# Patient Record
Sex: MALE | Race: WHITE | NOT HISPANIC OR LATINO | Employment: STUDENT | URBAN - METROPOLITAN AREA
[De-identification: names, ages, dates, MRNs, and addresses within clinical notes are randomized per-mention and may not be internally consistent; named-entity substitution may affect disease eponyms.]

---
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
## 2019-07-25 ENCOUNTER — OFFICE VISIT (OUTPATIENT)
Dept: PEDIATRICS CLINIC | Facility: MEDICAL CENTER | Age: 19
End: 2019-07-25
Payer: COMMERCIAL

## 2019-07-25 VITALS
SYSTOLIC BLOOD PRESSURE: 108 MMHG | BODY MASS INDEX: 21.12 KG/M2 | DIASTOLIC BLOOD PRESSURE: 78 MMHG | WEIGHT: 142.6 LBS | HEART RATE: 80 BPM | TEMPERATURE: 98.4 F | HEIGHT: 69 IN | RESPIRATION RATE: 18 BRPM

## 2019-07-25 DIAGNOSIS — Z00.129 ENCOUNTER FOR ROUTINE CHILD HEALTH EXAMINATION WITHOUT ABNORMAL FINDINGS: Primary | ICD-10-CM

## 2019-07-25 DIAGNOSIS — Z23 NEED FOR VACCINATION: ICD-10-CM

## 2019-07-25 DIAGNOSIS — Z13.31 SCREENING FOR DEPRESSION: ICD-10-CM

## 2019-07-25 DIAGNOSIS — Z71.82 EXERCISE COUNSELING: ICD-10-CM

## 2019-07-25 DIAGNOSIS — Z01.10 ENCOUNTER FOR HEARING EXAMINATION, UNSPECIFIED WHETHER ABNORMAL FINDINGS: ICD-10-CM

## 2019-07-25 DIAGNOSIS — Z01.00 ENCOUNTER FOR VISION SCREENING: ICD-10-CM

## 2019-07-25 DIAGNOSIS — F41.9 MILD ANXIETY: ICD-10-CM

## 2019-07-25 DIAGNOSIS — D22.71 MELANOCYTIC NEVUS OF RIGHT LOWER EXTREMITY: ICD-10-CM

## 2019-07-25 DIAGNOSIS — Z71.3 NUTRITIONAL COUNSELING: ICD-10-CM

## 2019-07-25 DIAGNOSIS — R59.9 REACTIVE LYMPHADENOPATHY: ICD-10-CM

## 2019-07-25 PROCEDURE — 99173 VISUAL ACUITY SCREEN: CPT | Performed by: PEDIATRICS

## 2019-07-25 PROCEDURE — 99395 PREV VISIT EST AGE 18-39: CPT | Performed by: PEDIATRICS

## 2019-07-25 PROCEDURE — 3725F SCREEN DEPRESSION PERFORMED: CPT | Performed by: PEDIATRICS

## 2019-07-25 PROCEDURE — 96127 BRIEF EMOTIONAL/BEHAV ASSMT: CPT | Performed by: PEDIATRICS

## 2019-07-25 PROCEDURE — 3008F BODY MASS INDEX DOCD: CPT | Performed by: PEDIATRICS

## 2019-07-25 PROCEDURE — 92552 PURE TONE AUDIOMETRY AIR: CPT | Performed by: PEDIATRICS

## 2019-07-25 NOTE — PROGRESS NOTES
Assessment/Plan:  Lucio Pina is a 70-year-old college student who presented for his yearly well visit today  His physical exam went well with no unusual problems noted  He does have some mild facial acne  He also pointed out to me a small reactive lymph node in the left posterior cervical region which has been present for years  He has no supraclavicular or submandibular nodes  There are no palpable anterior cervical lymph nodes today  The thyroid was not enlarged  He has no bruits over the neck  Cardiac exam revealed a normal sinus rhythm with no murmurs and he has no irregularities to his rate and rhythm  All of his pulses are easily palpable  Examination of the musculoskeletal system revealed no evidence of any abnormalities on joint exam     Lucio Pina has a small left paraspinal muscle prominence on back examination he has no significant scoliosis and no evidence of scapular asymmetry, shoulder tilt or leg length discrepancy  The remainder of his physical exam was negative today  I reviewed Wells Abt height weight and BMI today  Nutrition and Exercise Counseling: The patient's Body mass index is 20 94 kg/m²  This is 23 %ile (Z= -0 73) based on CDC (Boys, 2-20 Years) BMI-for-age based on BMI available as of 7/25/2019  Nutrition counseling provided:  Anticipatory guidance for nutrition given and counseled on healthy eating habits, 5 servings of fruits/vegetables and Avoid juice/sugary drinks    Exercise counseling provided:  Anticipatory guidance and counseling on exercise and physical activity given, Reduce screen time to less than 2 hours per day, 1 hour of aerobic exercise daily and Take stairs whenever possible     I ENCOURAGED ADONIS TO FIND FORMS OF EXERCISE THAT WOULD HELP HIM ESPECIALLY WITH SOME OF HIS RECENT ANXIETY  I RECOMMEND THAT HE EXERCISE AT LEAST 60 MINUTES DAILY AEROBICALLY    I also encouraged Lucio Pina to continue a well-balanced diet and to continue to make healthy choices including choosing from a variety of fresh fruits, vegetables, whole grains and lean proteins  I mentioned that he should try to eliminate simple sugars from his diet as much as possible  I reviewed his PHQ 9 questionnaire and has a borderline assessment with a score of 9  At the present time Shanique Gallardo is experiencing some anxiety regarding the relationship with his girlfriend  He states that this has him anxious and not acting himself  He has been feeling down about the change in relationship with his girlfriend  I recommend that he keeps in touch with me regarding his feelings anxiety and if things get worse I would recommend referral to a mental health specialist     Depression screen performed:  Patient screened- Negative     I recommended to the patient that he continues to have regular dental visits at least twice yearly  I encouraged him to use a soft toothbrush and a pea-sized amount of fluoride toothpaste to brush his teeth at least twice daily  Impression:  1  Healthy 22year-old college student  2   Recent onset of anxiety and feeling down due to change or break up in relationship with his girlfriend  3   Reactive lymph node left posterior cervical region with no other adenopathy noted  4   Mild adolescent acne  5   Left paraspinal muscle prominence in the lumbar region with no evidence of significant scoliosis  Plan:  1  I recommended that Shanique Gallardo received the meningeal coccal B vaccine and I provided him with literature as well as his mother today in order for them to review the recommendation regarding the vaccine  2   I also recommended that Shanique Gallardo and his mother consider the HPV vaccine series  3   I instructed Shanique Gallardo to keep in touch with me if he continues to feel down in order to have a follow-up visit in the office and in order to make a referral to a mental health specialist if needed    4   I schedule an appointment for Shanique Gallardo to return in 1 year for his next wellness assessment and physical exam         No problem-specific Assessment & Plan notes found for this encounter  Diagnoses and all orders for this visit:    Encounter for routine child health examination without abnormal findings    Screening for depression    Need for vaccination  -     MENINGOCOCCAL B RECOMBINANT    Encounter for hearing examination, unspecified whether abnormal findings    Encounter for vision screening    Body mass index, pediatric, 5th percentile to less than 85th percentile for age    Exercise counseling    Nutritional counseling    Reactive lymphadenopathy    Mild anxiety          Subjective:      Patient ID: Fortunato Dalal is a 22 y o  male  Maty Chen is a 50-year-old college student who presents for his yearly well visit today  He was accompanied to the visit by his younger brother and his mother although [place] exam was done in a separate room and privately with no one else present  He will be entering his 2nd year at [employer] late summer 2019  Maty Chen lives on campus  He is currently working on a summer research project which is in neuro sinuses and psychology area  He is evaluating the individuals perception of visual art in terms of interpretation of the art and the projects also involves performing retinal scans  He is not on any daily medicines and he has no known medication allergies  He did receive HPV vaccine and he will be due for meningeal coccal B vaccine which I will strongly recommend today  Maty Chen is currently having some anxiety regarding relationship be has with his girlfriend  He has not been himself according to the history  His appetite is good and he is having no sleep disruptive problems  His past medical history is unremarkable for any serious illness requiring hospitalization  The following portions of the patient's history were reviewed and updated as appropriate:   He  has a past medical history of Pneumonia    He There are no active problems to display for this patient  He  has a past surgical history that includes Circumcision  His family history includes Asthma in his family and father; Eczema in his family and father; No Known Problems in his mother  He  reports that he has never smoked  He has never used smokeless tobacco  He reports that he does not drink alcohol or use drugs  No current outpatient medications on file  No current facility-administered medications for this visit  No current outpatient medications on file prior to visit  No current facility-administered medications on file prior to visit  He has No Known Allergies       Review of Systems   Constitutional: Negative  HENT: Negative  Eyes: Positive for visual disturbance  Negative for photophobia, pain, discharge, redness and itching  Yasmine Piles wears corrective lenses for nearsightedness  Respiratory: Negative for cough, chest tightness, shortness of breath, wheezing and stridor  Cardiovascular: Negative for chest pain and palpitations  Gastrointestinal: Negative  Endocrine: Negative  Genitourinary: Negative  Musculoskeletal: Negative for back pain, gait problem, joint swelling, neck pain and neck stiffness  Skin: Negative  Allergic/Immunologic: Negative  Neurological: Negative for dizziness, tremors, seizures, syncope, weakness, light-headedness and headaches  Hematological: Negative  Negative for adenopathy  Does not bruise/bleed easily  Psychiatric/Behavioral: Negative for sleep disturbance and suicidal ideas  The patient is nervous/anxious  The patient states that he has been more anxious lately and nervous because of problems in the relationship with his girlfriend  Objective:      /78   Pulse 80   Temp 98 4 °F (36 9 °C) (Tympanic)   Resp 18   Ht 5' 9 2" (1 758 m)   Wt 64 7 kg (142 lb 9 6 oz)   BMI 20 94 kg/m²          Physical Exam   Constitutional: He is oriented to person, place, and time   He appears well-developed and well-nourished  No distress  HENT:   Head: Normocephalic  Right Ear: External ear normal    Left Ear: External ear normal    Nose: Nose normal    Mouth/Throat: Oropharynx is clear and moist  No oropharyngeal exudate  Both tympanic membranes are normal today  Eyes: Pupils are equal, round, and reactive to light  Conjunctivae and EOM are normal  Right eye exhibits no discharge  Left eye exhibits no discharge  No scleral icterus  Pippa Pace wears corrective lenses for nearsightedness  Neck: Normal range of motion  Neck supple  No thyromegaly present  The patient has a small freely movable lump in the left posterior cervical region which has been present for several years according to his history  It appears to be either a epidermoid cysts or a reactive lymph node  He has no other supraclavicular or submandibular or anterior cervical lymph nodes noted today  He has no bruits over the neck  Cardiovascular: Normal rate, regular rhythm, normal heart sounds and intact distal pulses  No murmur heard  Pulmonary/Chest: Effort normal and breath sounds normal    Abdominal: Soft  Bowel sounds are normal  He exhibits no distension and no mass  There is no tenderness  No hernia  Genitourinary: Rectum normal and penis normal    Genitourinary Comments: The patient is a Nadir stage 5  His testes are descended bilaterally  He has no evidence of a hernia today  Musculoskeletal: Normal range of motion  The musculoskeletal and joint exam was negative today  Inspection of the back and spine revealed no evidence of significant scoliosis and no scapular asymmetry  He does not have a significant shoulder tilt  He does have a small left lower lumbar paraspinal muscle prominence  He has no evidence of leg length discrepancy today  Lymphadenopathy:     He has cervical adenopathy  Neurological: He is alert and oriented to person, place, and time  He displays normal reflexes   No cranial nerve deficit  He exhibits normal muscle tone  Coordination normal    Skin: Skin is warm and dry  Capillary refill takes less than 2 seconds  No erythema  No pallor  Patient has some mild adolescent acne over the face with no significant scarring or comedones  Psychiatric: He has a normal mood and affect   His behavior is normal  Thought content normal

## 2019-07-26 NOTE — PATIENT INSTRUCTIONS
Autumn Tomas is a 65-year-old young man who presented for his well visit today  He is currently answering his 2nd year at [employer] and he does live on [facility]  Autumn Tomas is currently involved in a summer research project at school  He is not on any daily medicines and he has no known medication allergies  Javid's immunizations are up-to-date except for his HPV vaccine and the meningeal coccal B vaccine  I provided Autumn Tomas with information regarding the meningeal coccal vaccine which I highly recommend  I also recommend that he considers the HPV vaccine in the near future  Javid's physical exam was quite good with no unusual problems noted today  He does have a small left lumbar paraspinal muscle prominence in the lower back which is postural in nature  He also has a dark pigmented mole or nevus in the right upper leg or lateral thigh which has been present for many years  I recommend that he continues to use a sunscreen whenever he is outside on a cheryl day particularly if he is outside at the peak sun times between 10:00 a m  and 4:00 p m       I discussed some recent anxiety and "feeling down" symptoms that Autumn Tomas has experienced  I asked him to keep in touch with me if he continues to have feelings of anxiety and feeling down so that I can discuss these symptoms with him and possibly make a referral to a mental health specialist if necessary  His physical exam today revealed a mild left paraspinal muscle prominence but no evidence of scoliosis  Autumn Tomas has had a small freely movable reactive lymph node in the left posterior cervical region which appears to be a reactive lymph node and he has no other lymph adenopathy in the supraclavicular, submandibular or anterior cervical regions  I reviewed his length and weight and body mass index today  Nutrition and Exercise Counseling: The patient's Body mass index is 20 94 kg/m²   This is 23 %ile (Z= -0 73) based on CDC (Boys, 2-20 Years) BMI-for-age based on BMI available as of 7/25/2019  Nutrition counseling provided:  Anticipatory guidance for nutrition given and counseled on healthy eating habits, 5 servings of fruits/vegetables and Avoid juice/sugary drinks    Exercise counseling provided:  Anticipatory guidance and counseling on exercise and physical activity given, Reduce screen time to less than 2 hours per day, 1 hour of aerobic exercise daily and Take stairs whenever possible     Mukesh Decker would like to exercise more and I encouraged him to find something like a fun exercise program or riding a stationary bike, swimming, walking or participating in a fun sport at school in the intramural program   His BMI is quite good and he should continue to make healthy food choices by choosing from a variety of fresh fruits, vegetables, whole grains and lean proteins  Mukesh Decker does not have to drink whole milk but can drink 1 percent or low-fat milk  Milk is basically a source of vitamin-D at the present time  He should also try to eliminate simple sugars from his diet as much as possible  Mukesh Decker should continue to have regular dental visits and continue to use a soft toothbrush and a pea-sized amount of fluoride toothpaste to brush his teeth at least twice daily or after meals  The plan is for Mukesh Decker to keep in touch if he is having any increased anxiety or feeling down symptoms so that I can reassess him in the office  I will schedule an appointment for Mukesh Decker to return in 1 year for a yearly wellness assessment and physical exam unless he prefer to go to a family doctor

## 2019-08-21 ENCOUNTER — HOSPITAL ENCOUNTER (EMERGENCY)
Facility: HOSPITAL | Age: 19
Discharge: PSYCHIATRIC OR SUBSTANCE USE FACILITY | End: 2019-08-22
Attending: EMERGENCY MEDICINE
Payer: COMMERCIAL

## 2019-08-21 ENCOUNTER — TELEPHONE (OUTPATIENT)
Dept: OTHER | Facility: OTHER | Age: 19
End: 2019-08-21

## 2019-08-21 DIAGNOSIS — F33.2 SEVERE RECURRENT MAJOR DEPRESSION WITHOUT PSYCHOTIC FEATURES (HCC): Primary | ICD-10-CM

## 2019-08-21 DIAGNOSIS — R45.851 SUICIDAL IDEATION: ICD-10-CM

## 2019-08-21 DIAGNOSIS — F32.A DEPRESSION: ICD-10-CM

## 2019-08-21 PROCEDURE — 99285 EMERGENCY DEPT VISIT HI MDM: CPT

## 2019-08-21 PROCEDURE — 99285 EMERGENCY DEPT VISIT HI MDM: CPT | Performed by: EMERGENCY MEDICINE

## 2019-08-21 PROCEDURE — 80307 DRUG TEST PRSMV CHEM ANLYZR: CPT | Performed by: EMERGENCY MEDICINE

## 2019-08-22 ENCOUNTER — HOSPITAL ENCOUNTER (INPATIENT)
Facility: HOSPITAL | Age: 19
LOS: 5 days | Discharge: HOME | DRG: 751 | End: 2019-08-27
Attending: PSYCHIATRY & NEUROLOGY | Admitting: STUDENT IN AN ORGANIZED HEALTH CARE EDUCATION/TRAINING PROGRAM
Payer: COMMERCIAL

## 2019-08-22 VITALS
BODY MASS INDEX: 20.47 KG/M2 | OXYGEN SATURATION: 98 % | DIASTOLIC BLOOD PRESSURE: 34 MMHG | RESPIRATION RATE: 18 BRPM | HEART RATE: 73 BPM | SYSTOLIC BLOOD PRESSURE: 129 MMHG | TEMPERATURE: 98.4 F | WEIGHT: 143 LBS | HEIGHT: 70 IN

## 2019-08-22 DIAGNOSIS — F33.2 SEVERE RECURRENT MAJOR DEPRESSION WITHOUT PSYCHOTIC FEATURES (HCC): ICD-10-CM

## 2019-08-22 DIAGNOSIS — F32.2 MAJOR DEPRESSIVE DISORDER, SINGLE EPISODE, SEVERE WITHOUT PSYCHOTIC FEATURES (HCC): Primary | Chronic | ICD-10-CM

## 2019-08-22 LAB
AMPHETAMINES SERPL QL SCN: NEGATIVE
BARBITURATES UR QL: NEGATIVE
BENZODIAZ UR QL: NEGATIVE
COCAINE UR QL: NEGATIVE
METHADONE UR QL: NEGATIVE
OPIATES UR QL SCN: NEGATIVE
PCP UR QL: NEGATIVE
THC UR QL: NEGATIVE

## 2019-08-22 PROCEDURE — 93005 ELECTROCARDIOGRAM TRACING: CPT

## 2019-08-22 PROCEDURE — 99253 IP/OBS CNSLTJ NEW/EST LOW 45: CPT | Performed by: INTERNAL MEDICINE

## 2019-08-22 RX ORDER — HALOPERIDOL 5 MG/ML
5 INJECTION INTRAMUSCULAR EVERY 8 HOURS PRN
Status: DISCONTINUED | OUTPATIENT
Start: 2019-08-22 | End: 2019-08-23

## 2019-08-22 RX ORDER — HALOPERIDOL 5 MG
5 TABLET ORAL EVERY 8 HOURS PRN
Status: DISCONTINUED | OUTPATIENT
Start: 2019-08-22 | End: 2019-08-27 | Stop reason: HOSPADM

## 2019-08-22 RX ORDER — HALOPERIDOL 5 MG/ML
5 INJECTION INTRAMUSCULAR EVERY 8 HOURS PRN
Status: CANCELLED | OUTPATIENT
Start: 2019-08-22

## 2019-08-22 RX ORDER — ACETAMINOPHEN 325 MG/1
975 TABLET ORAL EVERY 6 HOURS PRN
Status: DISCONTINUED | OUTPATIENT
Start: 2019-08-22 | End: 2019-08-27 | Stop reason: HOSPADM

## 2019-08-22 RX ORDER — BENZTROPINE MESYLATE 1 MG/ML
1 INJECTION INTRAMUSCULAR; INTRAVENOUS EVERY 6 HOURS PRN
Status: DISCONTINUED | OUTPATIENT
Start: 2019-08-22 | End: 2019-08-23

## 2019-08-22 RX ORDER — HALOPERIDOL 5 MG
5 TABLET ORAL EVERY 8 HOURS PRN
Status: CANCELLED | OUTPATIENT
Start: 2019-08-22

## 2019-08-22 RX ORDER — ACETAMINOPHEN 325 MG/1
975 TABLET ORAL EVERY 6 HOURS PRN
Status: CANCELLED | OUTPATIENT
Start: 2019-08-22

## 2019-08-22 RX ORDER — ACETAMINOPHEN 325 MG/1
650 TABLET ORAL EVERY 4 HOURS PRN
Status: CANCELLED | OUTPATIENT
Start: 2019-08-22

## 2019-08-22 RX ORDER — MAGNESIUM HYDROXIDE/ALUMINUM HYDROXICE/SIMETHICONE 120; 1200; 1200 MG/30ML; MG/30ML; MG/30ML
30 SUSPENSION ORAL EVERY 4 HOURS PRN
Status: CANCELLED | OUTPATIENT
Start: 2019-08-22

## 2019-08-22 RX ORDER — MAGNESIUM HYDROXIDE/ALUMINUM HYDROXICE/SIMETHICONE 120; 1200; 1200 MG/30ML; MG/30ML; MG/30ML
30 SUSPENSION ORAL EVERY 4 HOURS PRN
Status: DISCONTINUED | OUTPATIENT
Start: 2019-08-22 | End: 2019-08-27 | Stop reason: HOSPADM

## 2019-08-22 RX ORDER — BENZTROPINE MESYLATE 1 MG/1
1 TABLET ORAL EVERY 6 HOURS PRN
Status: CANCELLED | OUTPATIENT
Start: 2019-08-22

## 2019-08-22 RX ORDER — TRAZODONE HYDROCHLORIDE 50 MG/1
50 TABLET ORAL
Status: CANCELLED | OUTPATIENT
Start: 2019-08-22

## 2019-08-22 RX ORDER — HYDROXYZINE HYDROCHLORIDE 25 MG/1
25 TABLET, FILM COATED ORAL EVERY 6 HOURS PRN
Status: CANCELLED | OUTPATIENT
Start: 2019-08-22

## 2019-08-22 RX ORDER — LORAZEPAM 0.5 MG/1
1 TABLET ORAL EVERY 8 HOURS PRN
Status: CANCELLED | OUTPATIENT
Start: 2019-08-22

## 2019-08-22 RX ORDER — BENZTROPINE MESYLATE 1 MG/ML
1 INJECTION INTRAMUSCULAR; INTRAVENOUS EVERY 6 HOURS PRN
Status: CANCELLED | OUTPATIENT
Start: 2019-08-22

## 2019-08-22 RX ORDER — LORAZEPAM 2 MG/ML
1 INJECTION INTRAMUSCULAR EVERY 8 HOURS PRN
Status: DISCONTINUED | OUTPATIENT
Start: 2019-08-22 | End: 2019-08-27 | Stop reason: HOSPADM

## 2019-08-22 RX ORDER — ACETAMINOPHEN 325 MG/1
650 TABLET ORAL EVERY 6 HOURS PRN
Status: DISCONTINUED | OUTPATIENT
Start: 2019-08-22 | End: 2019-08-27 | Stop reason: HOSPADM

## 2019-08-22 RX ORDER — LORAZEPAM 1 MG/1
1 TABLET ORAL EVERY 8 HOURS PRN
Status: DISCONTINUED | OUTPATIENT
Start: 2019-08-22 | End: 2019-08-27 | Stop reason: HOSPADM

## 2019-08-22 RX ORDER — RISPERIDONE 1 MG/1
1 TABLET, ORALLY DISINTEGRATING ORAL EVERY 8 HOURS PRN
Status: DISCONTINUED | OUTPATIENT
Start: 2019-08-22 | End: 2019-08-27 | Stop reason: HOSPADM

## 2019-08-22 RX ORDER — BENZTROPINE MESYLATE 1 MG/1
1 TABLET ORAL EVERY 6 HOURS PRN
Status: DISCONTINUED | OUTPATIENT
Start: 2019-08-22 | End: 2019-08-23

## 2019-08-22 RX ORDER — ACETAMINOPHEN 325 MG/1
650 TABLET ORAL EVERY 6 HOURS PRN
Status: CANCELLED | OUTPATIENT
Start: 2019-08-22

## 2019-08-22 RX ORDER — RISPERIDONE 1 MG/1
1 TABLET, ORALLY DISINTEGRATING ORAL EVERY 8 HOURS PRN
Status: CANCELLED | OUTPATIENT
Start: 2019-08-22

## 2019-08-22 RX ORDER — HYDROXYZINE HYDROCHLORIDE 25 MG/1
25 TABLET, FILM COATED ORAL EVERY 6 HOURS PRN
Status: DISCONTINUED | OUTPATIENT
Start: 2019-08-22 | End: 2019-08-27 | Stop reason: HOSPADM

## 2019-08-22 RX ORDER — TRAZODONE HYDROCHLORIDE 50 MG/1
50 TABLET ORAL
Status: DISCONTINUED | OUTPATIENT
Start: 2019-08-22 | End: 2019-08-27 | Stop reason: HOSPADM

## 2019-08-22 RX ORDER — ACETAMINOPHEN 325 MG/1
650 TABLET ORAL EVERY 4 HOURS PRN
Status: DISCONTINUED | OUTPATIENT
Start: 2019-08-22 | End: 2019-08-27 | Stop reason: HOSPADM

## 2019-08-22 RX ORDER — LORAZEPAM 2 MG/ML
1 INJECTION INTRAMUSCULAR EVERY 8 HOURS PRN
Status: CANCELLED | OUTPATIENT
Start: 2019-08-22

## 2019-08-22 NOTE — ED NOTES
Patient is accepted at [facility]  Patient is accepted by Dr Leena Gillespie per [address] is arranged with [employer] is scheduled for [place]  80      Nurse report is to be called to [phone] prior to patient transfer

## 2019-08-22 NOTE — PLAN OF CARE
Problem: Risk for Self Injury/Neglect  Goal: Verbalize thoughts and feelings  Description  Interventions:  - Assess and re-assess patient's lethality and potential for self-injury  - Engage patient in 1:1 interactions, daily, for a minimum of 15 minutes  - Encourage patient to express feelings, fears, frustrations, hopes  - Establish rapport/trust with patient   Outcome: Not Progressing  Goal: Refrain from harming self  Description  Interventions:  - Monitor patient closely, per order  - Develop a trusting relationship  - Supervise medication ingestion, monitor effects and side effects   Outcome: Not Progressing     Problem: Depression  Goal: Refrain from isolation  Description  Interventions:  - Develop a trusting relationship   - Encourage socialization   Outcome: Not Progressing  Goal: Attend and participate in unit activities, including therapeutic, recreational, and educational groups  Description  Interventions:  - Provide therapeutic and educational activities daily, encourage attendance and participation, and document same in the medical record   Outcome: Not Progressing     Problem: Risk for Violence/Aggression Toward Others  Goal: Treatment Goal: Refrain from acts of violence/aggression during length of stay, and demonstrate improved impulse control at the time of discharge  Outcome: Not Progressing  Goal: Refrain from harming others  Outcome: Not Progressing

## 2019-08-22 NOTE — TELEPHONE ENCOUNTER
[facility] 2000  CONFIDENTIALTY NOTICE: This fax transmission is intended only for the addressee  It contains information that is legally privileged,  confidential or otherwise protected from use or disclosure  If you are not the intended recipient, you are strictly prohibited from reviewing,  disclosing, copying using or disseminating any of this information or taking any action in reliance on or regarding this information  If you have  received this fax in error, please notify us immediately by telephone so that we can arrange for its return to us  Page: 1  2  Call Id: [id number]  Health Call  Standard Call Report  Health Call  [facility]  Gender[facility]  : 2000  Age: 22 Y 8 M 12 D  Return Phone  Number: [phone] (Home)  Address: [place] [address]  City/[facility]/Zip: [address] [place] [address]  Practice Name: [address] [place] [address] Charged:  Physician:  Greg Rdz Name: Munira [place]  Relationship To  Patient: Mother  Return Phone Number: [phone] (Home)  Presenting Problem: "My son is having a hard time  emotionally and I would like to receive  care advice "  Service Type: Messages  Charged Service 1: Messages  Pharmacy Name and  Number:  Nurse Assessment  Nurse: Niraj Pike Date/Time: 2019 9:28:18 PM  Type of assessment required:  Patient's mother refused of triage  ---General (Adult or Child)  Protocols  Protocol Title Nurse Date/Time  Disp  Time Disposition Final User  2019 9:43:55 PM Send to Follow Up [facility]  2019 10:46:12 PM Close Niraj Pike  Comments  User: Gonzalez Sánchez Date/Time: 2019 9:43:24 PM  Mother called, stated that her son might be having episode of depression for few days  Refused to go over triage questions  Mother requested to speak to Dr Harini Live right now  Mother was explained that there is Dr Emely Sin on call yuridia  Mother  inseasted to speak to Dr Harini Live  Dr Sean gordon was tiger paged  Awaiting for Dr Lisa Power to call back  User: Douglas Hanley Date/Time: 8/21/2019 10:45:34 PM  Called Dr Kourtney Carlton on call  Dr Lisa Power was made aware of patient's mother requested to speak to Dr Hiwot Ibarra  Per Dr Kourtney Carlton, patient to be taken to ER if he is a danger to himself  Called mother  instructed to take patient to ED if he is in  danger or to call office in a morning if patient is stable to wait till morning per Dr Sean Gordon's instructions  Mother was  upset, stated that she was willing to talk to doctor on call about the situation  She stated that she would take her son to ED now  Sally Lynch 2000  CONFIDENTIALTY NOTICE: This fax transmission is intended only for the addressee  It contains information that is legally privileged,  confidential or otherwise protected from use or disclosure  If you are not the intended recipient, you are strictly prohibited from reviewing,  disclosing, copying using or disseminating any of this information or taking any action in reliance on or regarding this information  If you have  received this fax in error, please notify us immediately by telephone so that we can arrange for its return to us    Page: 2 of 2  Call Id: [id number]  Comments

## 2019-08-22 NOTE — ED NOTES
Pt was brought to the ED tonight by his mother because of her increased concern for his safety  Pt told the ED doctor that he will take walks in the middle of the night to no where and has thoughts of harming himself  Pt states he feels "stuck" and isn't "going anywhere " Pt states he has times where he feels 'okay' but then goes right back to feeling "really bad"  He reports a recent break up with his girlfriend about a month ago when this episode started  Pt states he has not been sleeping well or eating for the past few weeks due to his increased anxiety and depression  Pt is insightful at times but feels that the coping skills he has been using like drawing are only a distraction  Pt feels "lonely" even though he knows he has a support system  Pt reports increased irritability at strangers for "stupid things" and has thoughts of fighting others but knows that "wouldn't be good"  Pt reports low self-worth and feels like he is not able to function on a day to day basis  He reports multiple assignment due for his summer schooling and work documents that need completed but states he feels "no drive to do them"  Pt willing to sign 201  Pt and his family requesting he stay within the area so they can visit him  They prefer [address] [facility] [facility] but pt is willing to go to [facility] as well   Pt 201 faxed to Intake

## 2019-08-22 NOTE — ED NOTES
Patient given beverage and snack   At this time patient upset and crying     Corky Morrow  08/21/19 0105

## 2019-08-22 NOTE — ED NOTES
Patient clinically stable, making no threats of self harm, harm to others  Not a flight risk  No psychosis  At this time, he does not require continual observation in the emergency department  León Holloway MD  Emergency Medicine       Cherylelodia Vallejo MD  08/22/19 4585

## 2019-08-22 NOTE — ED ATTENDING ATTESTATION
Jaylen Be MD, saw and evaluated the patient  I have discussed the patient with the resident/non-physician practitioner and agree with the resident's/non-physician practitioner's findings, Plan of Care, and MDM as documented in the resident's/non-physician practitioner's note, except where noted  All available labs and Radiology studies were reviewed  I was present for key portions of any procedure(s) performed by the resident/non-physician practitioner and I was immediately available to provide assistance  At this point I agree with the current assessment done in the Emergency Department  I have conducted an independent evaluation of this patient a history and physical is as follows: This is a 22 y o  old male who presents to the ED for evaluation of depression  Hx of same, episodes x1y  Severe the last few days  Is in college ,mom concerned if he goes back and he may not go back  -drugs, alcohol, vague SI, no plan, no HI, no psychosis or manic episodes  VS and nursing notes reviewed  General: Appears in NAD, awake, alert, speaking normally in full sentences  Well-nourished, well-developed  Appears stated age  Head: Normocephalic, atraumatic  Eyes: EOMI  Vision grossly normal  No subconjunctival hemorrhages or occular discharge noted  Symmetrical lids  ENT: Atraumatic external nose and ears  No stridor  Normal phonation  No drooling  Normal swallowing  Neck: No JVD  FROM  No goiter  CV: No pallor  Normal rate  Lungs: No tachypnea  No respiratory distress  MSK: Moving all extremities equally, no peripheral edema  Skin: Dry, intact  No cyanosis  Neuro: Awake, alert, GCS15  CN II-XII grossly intact  Grossly normal gait  Psychiatric/Behavioral: Appropriate mood and affect  A/P: This is a 22 y o  male who presents to the ED for evaluation of depression  We will check breath alcohol and UDS   Crisis referral     Critical Care Time  Procedures

## 2019-08-22 NOTE — ED PROVIDER NOTES
History  Chief Complaint   Patient presents with    Psychiatric Evaluation     Pt states he is depressed and having suicidal thoughts  Pt not specific about thoughts but also having HI and does not want to give details on who he is having those thoughts towards  Pt denies hallucinations and states there are no triggers for this episode  Patient presents at mother's wishes for severe depression 1 mo duration  Has suicidal thoughts    No prior attempt but does have prior thoughts, no treatment to date, no medical problems no medicines at baseline  Silvsetre is previously well functioning college student  Extra Depressed over then last month, might have started with depressive episodes over a year or so  Thought about killing self, jumping off stuff  Said he wanted to "go off, walk, go somewhere, hurting self or someone else"  "Don't have a plan"     Difficult taking care of self, getting work done, getting enough sleep, etc    Mother worried and wanted him to come in today, worried that he won't attend class or function in society in this state  Frustrating to him how things people do really bothers me for example people cat calling women, objectifying other people,  Frustrated at personal incompetence    Possible episodes of fouzia, hard to sort out from history  No drugs  Medium alcohol use  2 nights ago  No cigarette use          None       Past Medical History:   Diagnosis Date    Pneumonia     Sleep difficulties        Past Surgical History:   Procedure Laterality Date    CIRCUMCISION         Family History   Problem Relation Age of Onset    No Known Problems Mother     Asthma Father     Eczema Father     Asthma Family     Eczema Family      I have reviewed and agree with the history as documented  Social History     Tobacco Use    Smoking status: Never Smoker    Smokeless tobacco: Never Used    Tobacco comment: no tobacco/smoke exposure   Substance Use Topics    Alcohol use:  Yes Frequency: Monthly or less     Drinks per session: 1 or 2     Binge frequency: Never    Drug use: No        Review of Systems   Constitutional: Negative for activity change, chills, diaphoresis, fatigue and fever  HENT: Negative for congestion, postnasal drip, rhinorrhea, sneezing, sore throat and trouble swallowing  Eyes: Negative for visual disturbance  Respiratory: Negative for cough, chest tightness, shortness of breath and wheezing  Cardiovascular: Negative for chest pain and leg swelling  Gastrointestinal: Negative for abdominal distention, abdominal pain, blood in stool, constipation, diarrhea, nausea and vomiting  Genitourinary: Negative for decreased urine volume, dysuria, flank pain, frequency, hematuria and urgency  Skin: Negative for color change and rash  Neurological: Negative for syncope, weakness, light-headedness and headaches  Psychiatric/Behavioral: Positive for decreased concentration, dysphoric mood, sleep disturbance and suicidal ideas  Negative for confusion  All other systems reviewed and are negative  Physical Exam  ED Triage Vitals [08/21/19 2301]   Temperature Pulse Respirations Blood Pressure SpO2   98 4 °F (36 9 °C) 81 18 140/81 100 %      Temp Source Heart Rate Source Patient Position - Orthostatic VS BP Location FiO2 (%)   Oral Monitor Lying Right arm --      Pain Score       No Pain             Orthostatic Vital Signs  Vitals:    08/21/19 2301 08/22/19 0430 08/22/19 0849 08/22/19 1311   BP: 140/81 102/64 118/62 (!) 129/34   Pulse: 81 77 88 73   Patient Position - Orthostatic VS: Lying Lying Sitting Sitting       Physical Exam   Constitutional: He is oriented to person, place, and time  He appears well-developed and well-nourished  No distress  HENT:   Head: Normocephalic and atraumatic  Nose: Nose normal    Eyes: Pupils are equal, round, and reactive to light  Conjunctivae and EOM are normal  No scleral icterus  Neck: Normal range of motion   Neck supple  No tracheal deviation present  Cardiovascular: Normal rate, regular rhythm, normal heart sounds and intact distal pulses  No murmur heard  Pulmonary/Chest: Effort normal and breath sounds normal  No stridor  No respiratory distress  He has no wheezes  Abdominal: Soft  Bowel sounds are normal  He exhibits no distension  There is no tenderness  There is no guarding  Musculoskeletal: Normal range of motion  He exhibits no edema, tenderness or deformity  Neurological: He is alert and oriented to person, place, and time  No cranial nerve deficit or sensory deficit  He exhibits normal muscle tone  Skin: Skin is warm and dry  Capillary refill takes less than 2 seconds  He is not diaphoretic  Psychiatric:   tearful   Nursing note and vitals reviewed  ED Medications  Medications - No data to display    Diagnostic Studies  Results Reviewed     Procedure Component Value Units Date/Time    Rapid drug screen, urine [[id number]]  (Normal) Collected:  08/21/19 2346    Lab Status:  Final result Specimen:  Urine, Clean Catch Updated:  08/22/19 0033     Amph/Meth UR Negative     Barbiturate Ur Negative     Benzodiazepine Urine Negative     Cocaine Urine Negative     Methadone Urine Negative     Opiate Urine Negative     PCP Ur Negative     THC Urine Negative    Narrative:       FOR MEDICAL PURPOSES ONLY  IF CONFIRMATION NEEDED PLEASE CONTACT THE LAB WITHIN 5 DAYS      Drug Screen Cutoff Levels:  AMPHETAMINE/METHAMPHETAMINES  1000 ng/mL  BARBITURATES     200 ng/mL  BENZODIAZEPINES     200 ng/mL  COCAINE      300 ng/mL  METHADONE      300 ng/mL  OPIATES      300 ng/mL  PHENCYCLIDINE     25 ng/mL  THC       50 ng/mL                   No orders to display         Procedures  Procedures        ED Course                               MDM  Number of Diagnoses or Management Options  Depression:   Suicidal ideation:   Diagnosis management comments: Crisis consulted    [address] heart      Disposition  Final diagnoses:   Depression   Suicidal ideation     Time reflects when diagnosis was documented in both MDM as applicable and the Disposition within this note     Time User Action Codes Description Comment    8/22/2019 11:49 AM Ansley Wu Add [F33 2] Severe recurrent major depression without psychotic features ([facility] [address] )     8/22/2019  2:28 PM Shayy  Add [F32 9] Depression     8/22/2019  2:28 PM [place]  Add [R45 851] Suicidal ideation       ED Disposition     ED Disposition Condition Date/Time Comment    Transfer to [facility] Aug 22, 2019  1:14 PM Emily Howard should be transferred out to [place] and has been medically cleared  MD Documentation      Most Recent Value   Accepting Physician  Fan Mckeon Name, Yuri Patton   Sending MD  [facility] Name, Yuri Patton      Follow-up Information    None         There are no discharge medications for this patient  No discharge procedures on file  ED Provider  Attending physically available and evaluated Emily Howard  I managed the patient along with the ED Attending      Electronically Signed by         Jeanine Mckeon MD  08/23/19 0571

## 2019-08-22 NOTE — EMTALA/ACUTE CARE TRANSFER
[facility] EMERGENCY DEPARTMENT  [address] [place] [address]  Dept: [phone]      JVPRAO TRANSFER CONSENT    NAME Jeremias Meade                                         2000                              MRN [id number]    I have been informed of my rights regarding examination, treatment, and transfer   by Dr Milan Mcdermott MD    Benefits:      Risks:        Transfer Request   I acknowledge that my medical condition has been evaluated and explained to me by the emergency department physician or other qualified medical person and/or my attending physician who has recommended and offered to me further medical examination and treatment  I understand the Hospital's obligation with respect to the treatment and stabilization of my emergency medical condition  I nevertheless request to be transferred  I release the Hospital, the doctor, and any other persons caring for me from all responsibility or liability for any injury or ill effects that may result from my transfer and agree to accept all responsibility for the consequences of my choice to transfer, rather than receive stabilizing treatment at the Hospital  I understand that because the transfer is my request, my insurance may not provide reimbursement for the services  The Hospital will assist and direct me and my family in how to make arrangements for transfer, but the hospital is not liable for any fees charged by the transport service  In spite of this understanding, I refuse to consent to further medical examination and treatment which has been offered to me, and request transfer to  [place] Name, [address] : [facility]  I authorize the performance of emergency medical procedures and treatments upon me in both transit and upon arrival at the receiving facility  Additionally, I authorize the release of any and all medical records to the receiving facility and request they be transported with me, if possible      I authorize the performance of emergency medical procedures and treatments upon me in both transit and upon arrival at the receiving facility  Additionally, I authorize the release of any and all medical records to the receiving facility and request they be transported with me, if possible  I understand that the safest mode of transportation during a medical emergency is an ambulance and that the Hospital advocates the use of this mode of transport  Risks of traveling to the receiving facility by car, including absence of medical control, life sustaining equipment, such as oxygen, and medical personnel has been explained to me and I fully understand them  (LAMAR CORRECT BOX BELOW)  [  ]  I consent to the stated transfer and to be transported by ambulance/helicopter  [  ]  I consent to the stated transfer, but refuse transportation by ambulance and accept full responsibility for my transportation by car  I understand the risks of non-ambulance transfers and I exonerate the Hospital and its staff from any deterioration in my condition that results from this refusal     X___________________________________________    DATE  19  TIME________  Signature of patient or legally responsible individual signing on patient behalf           RELATIONSHIP TO PATIENT_________________________          Provider Certification    NAME James Apodaca                                        [facility] 2000                              MRN [id number]    A medical screening exam was performed on the above named patient  Based on the examination:    Condition Necessitating Transfer The encounter diagnosis was Severe recurrent major depression without psychotic features ([facility] [address] )      Patient Condition:      Reason for Transfer:      Transfer Requirements: [address]   · Space available and qualified personnel available for treatment as acknowledged by    · Agreed to accept transfer and to provide appropriate medical treatment as acknowledged by       [facility]  · Appropriate medical records of the examination and treatment of the patient are provided at the time of transfer   [facility], [address] _______  · Transfer will be performed by qualified personnel from    and appropriate transfer equipment as required, including the use of necessary and appropriate life support measures  Provider Certification: I have examined the patient and explained the following risks and benefits of being transferred/refusing transfer to the patient/family:         Based on these reasonable risks and benefits to the patient and/or the unborn child(reinaldo), and based upon the information available at the time of the patients examination, I certify that the medical benefits reasonably to be expected from the provision of appropriate medical treatments at another medical facility outweigh the increasing risks, if any, to the individuals medical condition, and in the case of labor to the unborn child, from effecting the transfer      X____________________________________________ DATE 08/22/19        TIME_______      ORIGINAL - SEND TO MEDICAL RECORDS   COPY - SEND WITH PATIENT DURING TRANSFER

## 2019-08-22 NOTE — ED NOTES
Pt states he feels safe to himself while in the ED and does not need a 1:1 at this time  He is calm and cooperative

## 2019-08-22 NOTE — ED NOTES
Pt resting comfortably at this time        Andre Pino  08/22/19 1511       Michelle Caban  08/22/19 1519

## 2019-08-22 NOTE — ED NOTES
Insurance Authorization:   Phone call placed to [employer] number: [phone]  Spoke to [employer]  4 days approved  Level of care: IPU  Review on pending placement   Authorization # call upon arrival         EVS (Eligibility Verification System) called - [phone]    Automated system indicates: managed care with [facility]

## 2019-08-23 PROBLEM — F33.2 MAJOR DEPRESSIVE DISORDER, RECURRENT, SEVERE WITHOUT PSYCHOTIC FEATURES (HCC): Chronic | Status: ACTIVE | Noted: 2019-08-23

## 2019-08-23 PROBLEM — F32.2 MAJOR DEPRESSIVE DISORDER, SINGLE EPISODE, SEVERE WITHOUT PSYCHOTIC FEATURES (HCC): Chronic | Status: ACTIVE | Noted: 2019-08-23

## 2019-08-23 LAB
ALBUMIN SERPL BCP-MCNC: 4.5 G/DL (ref 3–5.2)
ALP SERPL-CCNC: 75 U/L (ref 43–122)
ALT SERPL W P-5'-P-CCNC: 15 U/L (ref 9–52)
ANION GAP SERPL CALCULATED.3IONS-SCNC: 9 MMOL/L (ref 5–14)
AST SERPL W P-5'-P-CCNC: 33 U/L (ref 17–59)
ATRIAL RATE: 62 BPM
ATRIAL RATE: 62 BPM
BASOPHILS # BLD AUTO: 0 THOUSANDS/ΜL (ref 0–0.1)
BASOPHILS NFR BLD AUTO: 1 % (ref 0–1)
BILIRUB SERPL-MCNC: 0.7 MG/DL
BUN SERPL-MCNC: 14 MG/DL (ref 5–25)
CALCIUM SERPL-MCNC: 9.9 MG/DL (ref 8.9–10.7)
CHLORIDE SERPL-SCNC: 99 MMOL/L (ref 97–108)
CHOLEST SERPL-MCNC: 163 MG/DL
CO2 SERPL-SCNC: 31 MMOL/L (ref 22–30)
CREAT SERPL-MCNC: 1.24 MG/DL (ref 0.7–1.5)
EOSINOPHIL # BLD AUTO: 0.2 THOUSAND/ΜL (ref 0–0.4)
EOSINOPHIL NFR BLD AUTO: 3 % (ref 0–6)
ERYTHROCYTE [DISTWIDTH] IN BLOOD BY AUTOMATED COUNT: 13 %
EST. AVERAGE GLUCOSE BLD GHB EST-MCNC: 97 MG/DL
GFR SERPL CREATININE-BSD FRML MDRD: 84 ML/MIN/1.73SQ M
GLUCOSE P FAST SERPL-MCNC: 87 MG/DL (ref 70–99)
GLUCOSE SERPL-MCNC: 87 MG/DL (ref 70–99)
HBA1C MFR BLD: 5 % (ref 4.2–6.3)
HCT VFR BLD AUTO: 44.6 % (ref 41–53)
HDLC SERPL-MCNC: 41 MG/DL (ref 40–59)
HGB BLD-MCNC: 15.1 G/DL (ref 13.5–17.5)
LDLC SERPL CALC-MCNC: 101 MG/DL
LYMPHOCYTES # BLD AUTO: 2.3 THOUSANDS/ΜL (ref 0.5–4)
LYMPHOCYTES NFR BLD AUTO: 38 % (ref 25–45)
MCH RBC QN AUTO: 29.5 PG (ref 26–34)
MCHC RBC AUTO-ENTMCNC: 33.9 G/DL (ref 31–36)
MCV RBC AUTO: 87 FL (ref 80–100)
MONOCYTES # BLD AUTO: 0.5 THOUSAND/ΜL (ref 0.2–0.9)
MONOCYTES NFR BLD AUTO: 8 % (ref 1–10)
NEUTROPHILS # BLD AUTO: 3.1 THOUSANDS/ΜL (ref 1.8–7.8)
NEUTS SEG NFR BLD AUTO: 52 % (ref 45–65)
NONHDLC SERPL-MCNC: 122 MG/DL
P AXIS: 71 DEGREES
P AXIS: 71 DEGREES
PLATELET # BLD AUTO: 206 THOUSANDS/UL (ref 150–450)
PMV BLD AUTO: 8.7 FL (ref 8.9–12.7)
POTASSIUM SERPL-SCNC: 4.4 MMOL/L (ref 3.6–5)
PR INTERVAL: 148 MS
PR INTERVAL: 148 MS
PROT SERPL-MCNC: 7.1 G/DL (ref 5.9–8.4)
QRS AXIS: 101 DEGREES
QRS AXIS: 101 DEGREES
QRSD INTERVAL: 86 MS
QRSD INTERVAL: 86 MS
QT INTERVAL: 394 MS
QT INTERVAL: 394 MS
QTC INTERVAL: 399 MS
QTC INTERVAL: 399 MS
RBC # BLD AUTO: 5.14 MILLION/UL (ref 4.5–5.9)
SODIUM SERPL-SCNC: 139 MMOL/L (ref 137–147)
T WAVE AXIS: 51 DEGREES
T WAVE AXIS: 51 DEGREES
TRIGL SERPL-MCNC: 105 MG/DL
TSH SERPL DL<=0.05 MIU/L-ACNC: 1.14 UIU/ML (ref 0.47–4.68)
VENTRICULAR RATE: 62 BPM
VENTRICULAR RATE: 62 BPM
WBC # BLD AUTO: 6 THOUSAND/UL (ref 4.5–11)

## 2019-08-23 PROCEDURE — 84443 ASSAY THYROID STIM HORMONE: CPT | Performed by: STUDENT IN AN ORGANIZED HEALTH CARE EDUCATION/TRAINING PROGRAM

## 2019-08-23 PROCEDURE — 83036 HEMOGLOBIN GLYCOSYLATED A1C: CPT | Performed by: PSYCHIATRY & NEUROLOGY

## 2019-08-23 PROCEDURE — 99222 1ST HOSP IP/OBS MODERATE 55: CPT | Performed by: PSYCHIATRY & NEUROLOGY

## 2019-08-23 PROCEDURE — 80053 COMPREHEN METABOLIC PANEL: CPT | Performed by: STUDENT IN AN ORGANIZED HEALTH CARE EDUCATION/TRAINING PROGRAM

## 2019-08-23 PROCEDURE — 80061 LIPID PANEL: CPT | Performed by: STUDENT IN AN ORGANIZED HEALTH CARE EDUCATION/TRAINING PROGRAM

## 2019-08-23 PROCEDURE — 86592 SYPHILIS TEST NON-TREP QUAL: CPT | Performed by: STUDENT IN AN ORGANIZED HEALTH CARE EDUCATION/TRAINING PROGRAM

## 2019-08-23 PROCEDURE — 93010 ELECTROCARDIOGRAM REPORT: CPT | Performed by: INTERNAL MEDICINE

## 2019-08-23 PROCEDURE — 85025 COMPLETE CBC W/AUTO DIFF WBC: CPT | Performed by: STUDENT IN AN ORGANIZED HEALTH CARE EDUCATION/TRAINING PROGRAM

## 2019-08-23 RX ORDER — HALOPERIDOL 5 MG/ML
5 INJECTION INTRAMUSCULAR EVERY 6 HOURS PRN
Status: DISCONTINUED | OUTPATIENT
Start: 2019-08-23 | End: 2019-08-27 | Stop reason: HOSPADM

## 2019-08-23 RX ORDER — OLANZAPINE 10 MG/1
10 TABLET, ORALLY DISINTEGRATING ORAL
Status: DISCONTINUED | OUTPATIENT
Start: 2019-08-23 | End: 2019-08-27 | Stop reason: HOSPADM

## 2019-08-23 RX ORDER — OLANZAPINE 10 MG/1
10 INJECTION, POWDER, LYOPHILIZED, FOR SOLUTION INTRAMUSCULAR
Status: DISCONTINUED | OUTPATIENT
Start: 2019-08-23 | End: 2019-08-27 | Stop reason: HOSPADM

## 2019-08-23 RX ORDER — BENZTROPINE MESYLATE 1 MG/ML
1 INJECTION INTRAMUSCULAR; INTRAVENOUS EVERY 6 HOURS PRN
Status: DISCONTINUED | OUTPATIENT
Start: 2019-08-23 | End: 2019-08-27 | Stop reason: HOSPADM

## 2019-08-23 RX ORDER — BENZTROPINE MESYLATE 1 MG/1
1 TABLET ORAL EVERY 6 HOURS PRN
Status: DISCONTINUED | OUTPATIENT
Start: 2019-08-23 | End: 2019-08-27 | Stop reason: HOSPADM

## 2019-08-23 RX ORDER — SERTRALINE HYDROCHLORIDE 25 MG/1
25 TABLET, FILM COATED ORAL ONCE
Status: COMPLETED | OUTPATIENT
Start: 2019-08-23 | End: 2019-08-23

## 2019-08-23 RX ADMIN — SERTRALINE HYDROCHLORIDE 25 MG: 25 TABLET ORAL at 13:20

## 2019-08-23 NOTE — TREATMENT PLAN
TREATMENT PLAN REVIEW - [address] 22 y o  2000 male MRN: [id number]    [address] [address] Room / Bed: [facility]- Encounter: [id number]        Admit Date/Time:  8/22/2019  6:28 PM    Treatment Team: Attending Provider: Frieda Odom MD; Patient Care Technician: Austin Bravo; Registered Nurse: Elease Bamberger, RN; Patient Care Technician: Monda Cowden    Diagnosis: Principal Problem:    Major depressive disorder, single episode, severe without psychotic features [facility])    Patient Strengths/Assets: general fund of knowledge, motivated, patient is on a voluntary commitment, supportive mother      Patient Barriers/Limitations: break up with girlfriend, low self esteem    Short Term Goals: decrease in depressive symptoms, decrease in anxiety symptoms, decrease in suicidal thoughts, decrease in homicidal thoughts, tolerate medications    Long Term Goals: improvement in anxiety, resolution of depressive symptoms, free of suicidal thoughts, free of homicidal thoughts, adequate sleep    Progress Towards Goals: starting psychiatric medications as prescribed    Recommended Treatment: medication management, patient medication education, group therapy, milieu therapy, continued Behavioral Health psychiatric evaluation/assessment process     Treatment Frequency: daily medication monitoring, group and milieu therapy daily, monitoring through interdisciplinary rounds, monitoring through weekly patient care conferences    Expected Discharge Date: 4 days - 8/27/2019    Discharge Plan: referral for outpatient medication management with a psychiatrist, referral for outpatient psychotherapy, return to previous living arrangement    Treatment Plan Created/Updated By: Saskia Gar MD

## 2019-08-23 NOTE — PLAN OF CARE
Problem: SLEEP DISTURBANCE  Goal: Will exhibit normal sleeping pattern  Description  Interventions:  -  Assess the patients sleep pattern, noting recent changes  - Administer medication as ordered  - Decrease environmental stimuli, including noise, as appropriate during the night  - Encourage the patient to actively participate in unit groups and or exercise during the day to enhance ability to achieve adequate sleep at night  - Assess the patient, in the morning, encouraging a description of sleep experience  Outcome: Not Progressing     Problem: SELF HARM/SUICIDALITY  Goal: Will have no self-injury during hospital stay  Description  INTERVENTIONS:  - Q 15 MINUTES: Routine safety checks  - Q WAKING SHIFT & PRN: Assess risk to determine if routine checks are adequate to maintain patient safety  - Encourage patient to participate actively in care by formulating a plan to combat response to suicidal ideation, identify supports and resources  Outcome: Not Progressing

## 2019-08-23 NOTE — PROGRESS NOTES
08/23/19 0838   Team Meeting   Meeting Type Tx Team Meeting   Initial Conference Date 08/23/19   Team Members Present   Team Members Present Physician;Nurse;;; Other (Discipline and Name)   Physician Team Member Dr Vannessa Stevens, [facility]   Social Work Team Member Jaydon   Other (Discipline and Name) Med student   Patient/Family Present   Patient Present No   Patient's Family Present No   Medication management

## 2019-08-23 NOTE — CASE MANAGEMENT
Pt presented to [facility] as a [facility] accompanied by his parents due to increased symptom severity of depression, anxiety, inner agitation, loneliness and homicidal ideation towards people in general; pt had SI with plans to jump off a bridge or strangle himself  Pt reports no history of prior psychiatric treatment and denies substance abuse      Symptoms prior to admission included worsening depression, suicidal ideation, hopelessness, helplessness, poor concentration, difficulty sleeping, increased irritability and anxiety symptoms  Onset of symptoms was gradual starting 1 year ago with progressively worsening course over the last month  Stressors preceding admission included family issues and break up with girlfriend  Arsh Anderson was brought in to ED by his mother due to worsening depression and thoughts of self harm  He reported poor motivation, feeling angry at strangers and thoughts of fighting others  His mother was concerned about his safety and brought him in to ED     On initial evaluation after admission to the inpatient psychiatric unit Arsh Anderson was still feeling depressed and still had suicidal thoughts, but felt safe on the inpatient unit He also seemed very anxious and preoccupied with his school work  He reluctantly agreed to try an antidepressant to help with his symptoms  Pt also appears paranoid and stares at times in a bizarre and preoccupied fashion  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 [employer] and his classes will start next week, he is very anxious that he will not be there to move in this weekend  Pt states he has no mental health providers  Pt signed Ray Rick for his mother [phone] or [phone]     Spoke with pt's mother, she states the pt did not have a childhood mental health history but that he looked very stressed and pressured most of last year and thinks his anxiety and depression levels were building since that time and when his girlfriend (first serious relationship) broke up with him, that was the last straw  Pt's mom states that depression and psychosis run in both sides of the family, her mother had psychosis issues   Pt's mother is supportive of pt and states he should remain in hospital until psychiatrist is comfortable with discharging him; she will help him regarding college and what they need to do regarding contacting the school

## 2019-08-23 NOTE — PROGRESS NOTES
Pt is admission from [facility] ED as 201  Has prolonged history of depression and "can't remember within the last few months when he felt happy once during an entire day " States last night he had some active thoughts to kill himself but lacked the means of doing so  Suggested he considered strangulation of falling from high location  Preceding events triggering decline in mood are recent breakup with girlfriend of a year and a half  Pt became nearly tearful when describing situation  Also cited some anger towards others, which is ironic considering he "actually likes and gets along with most people " Particular behaviors such as "those that objectify women" cause him anger  Report states pt has considered thoughts of "getting in fight" with someone to deal with anger; but does not currently hold these feelings  Suggests there's reason to consider underlying depression prior to breakup  States "I often feel lonely in general, and don't have anyone to share my emotions with and they tend to get bottled up " Also states while his parents love him he does "not always believe they care about him and it's usually about their best interests "    Denies substance abuse and alcohol misuse  Currently has no thoughts to harm self and contracts for safety of self and others  I polite and cooperative, offering good insight into emotions and areas needing progress

## 2019-08-23 NOTE — PROGRESS NOTES
[facility] NOTE     08/23/19 1000   Activity/Group Checklist   Group Admission/Discharge  (Completed Adm Self Assess, DERs)   Attendance Attended   Attendance Duration (min) 16-30   Interactions Interacted appropriately   Affect/Mood Appropriate   Goals Achieved Identified feelings; Able to self-disclose

## 2019-08-23 NOTE — PROGRESS NOTES
Patient tearful talking about former GF  Patient blames himself and and states "If I had been a better person and more considerate "  Patient feels he is "obsessing" over the GF and feels he "can't move forward and I dont know if I want to "  Patient denies SI/HI and denies A/V hallucinations   Patient has fair eye contact, good insight, and is medication compliant

## 2019-08-23 NOTE — PLAN OF CARE
Problem: Nutrition/Hydration-ADULT  Goal: Nutrient/Hydration intake appropriate for improving, restoring or maintaining nutritional needs  Description  Monitor and assess patient's nutrition/hydration status for malnutrition  Collaborate with interdisciplinary team and initiate plan and interventions as ordered  Monitor patient's weight and dietary intake as ordered or per policy  Utilize nutrition screening tool and intervene as necessary  Determine patient's food preferences and provide high-protein, high-caloric foods as appropriate       INTERVENTIONS:  - Monitor oral intake, urinary output, labs, and treatment plans  - Assess nutrition and hydration status and recommend course of action  - Evaluate amount of meals eaten  - Assist patient with eating if necessary   - Allow adequate time for meals  - Recommend/ encourage appropriate diets, oral nutritional supplements, and vitamin/mineral supplements  - Order, calculate, and assess calorie counts as needed  - Recommend, monitor, and adjust tube feedings and TPN/PPN based on assessed needs  - Assess need for intravenous fluids  - Provide specific nutrition/hydration education as appropriate  - Include patient/family/caregiver in decisions related to nutrition  Outcome: Not Progressing

## 2019-08-23 NOTE — PLAN OF CARE
Problem: Risk for Self Injury/Neglect  Goal: Verbalize thoughts and feelings  Description  Interventions:  - Assess and re-assess patient's lethality and potential for self-injury  - Engage patient in 1:1 interactions, daily, for a minimum of 15 minutes  - Encourage patient to express feelings, fears, frustrations, hopes  - Establish rapport/trust with patient   Outcome: Progressing  Goal: Refrain from harming self  Description  Interventions:  - Monitor patient closely, per order  - Develop a trusting relationship  - Supervise medication ingestion, monitor effects and side effects   Outcome: Progressing     Problem: Depression  Goal: Refrain from isolation  Description  Interventions:  - Develop a trusting relationship   - Encourage socialization   Outcome: Progressing  Goal: Attend and participate in unit activities, including therapeutic, recreational, and educational groups  Description  Interventions:  - Provide therapeutic and educational activities daily, encourage attendance and participation, and document same in the medical record   Outcome: Progressing     Problem: Risk for Violence/Aggression Toward Others  Goal: Treatment Goal: Refrain from acts of violence/aggression during length of stay, and demonstrate improved impulse control at the time of discharge  Outcome: Progressing  Goal: Refrain from harming others  Outcome: Progressing     Problem: Nutrition/Hydration-ADULT  Goal: Nutrient/Hydration intake appropriate for improving, restoring or maintaining nutritional needs  Description  Monitor and assess patient's nutrition/hydration status for malnutrition  Collaborate with interdisciplinary team and initiate plan and interventions as ordered  Monitor patient's weight and dietary intake as ordered or per policy  Utilize nutrition screening tool and intervene as necessary  Determine patient's food preferences and provide high-protein, high-caloric foods as appropriate       INTERVENTIONS:  - Monitor oral intake, urinary output, labs, and treatment plans  - Assess nutrition and hydration status and recommend course of action  - Evaluate amount of meals eaten  - Assist patient with eating if necessary   - Allow adequate time for meals  - Recommend/ encourage appropriate diets, oral nutritional supplements, and vitamin/mineral supplements  - Order, calculate, and assess calorie counts as needed  - Recommend, monitor, and adjust tube feedings and TPN/PPN based on assessed needs  - Assess need for intravenous fluids  - Provide specific nutrition/hydration education as appropriate  - Include patient/family/caregiver in decisions related to nutrition  Outcome: Progressing     Problem: SELF HARM/SUICIDALITY  Goal: Will have no self-injury during hospital stay  Description  INTERVENTIONS:  - Q 15 MINUTES: Routine safety checks  - Q WAKING SHIFT & PRN: Assess risk to determine if routine checks are adequate to maintain patient safety  - Encourage patient to participate actively in care by formulating a plan to combat response to suicidal ideation, identify supports and resources  Outcome: Progressing     Problem: SLEEP DISTURBANCE  Goal: Will exhibit normal sleeping pattern  Description  Interventions:  -  Assess the patients sleep pattern, noting recent changes  - Administer medication as ordered  - Decrease environmental stimuli, including noise, as appropriate during the night  - Encourage the patient to actively participate in unit groups and or exercise during the day to enhance ability to achieve adequate sleep at night  - Assess the patient, in the morning, encouraging a description of sleep experience  Outcome: Progressing

## 2019-08-23 NOTE — PROGRESS NOTES
[facility] NOTE     08/23/19 1115   Activity/Group Checklist   Group Personal control  (Jalen Chi)   Attendance Attended   Attendance Duration (min) 0-15   Interactions Did not interact   Affect/Mood Calm   Learning Objectives  Mind/Body Connection  Relaxation as part of daily Routine  Relaxation as coping strategy for emotional distress  Benefits of Proactive Relaxation  Modified Jalen Chi Technique (movement and breath)

## 2019-08-23 NOTE — CONSULTS
Consulted for Medical Management by:  [facility]    History of Present Illness     HPI:  Miguel Ángel Ricketts is a 22 y o  male who presents with pmhx of anxietythat was brought in by family for depression and suicidal ideation  Pt was said to have a recent relationship breakup with his girlfriend  Denies any use of illicit drugs or etoh abuse  Pt had thought of strangulation or jumping off from a high location  He also had some homicidal ideation  Currently states he is feeling better  No auditory or visual hallucinations  No headache, blurry vision  No cp, sob, no abdominal pain, n/v/d  No syncope or seizures  Historical Information   Past Medical History:   Diagnosis Date    Pneumonia     Sleep difficulties      There is no problem list on file for this patient      Past Surgical History:   Procedure Laterality Date    CIRCUMCISION         Social History   Social History     Substance and Sexual Activity   Alcohol Use Yes    Frequency: Monthly or less    Drinks per session: 1 or 2    Binge frequency: Never     Social History     Substance and Sexual Activity   Drug Use No     Social History     Tobacco Use   Smoking Status Never Smoker   Smokeless Tobacco Never Used   Tobacco Comment    no tobacco/smoke exposure       Family History:   Family History   Problem Relation Age of Onset    No Known Problems Mother     Asthma Father     Eczema Father     Asthma Family     Eczema Family        Meds/Allergies       Current Facility-Administered Medications:     acetaminophen (TYLENOL) tablet 650 mg, 650 mg, Oral, Q6H PRN, Jayde Basurto MD    acetaminophen (TYLENOL) tablet 650 mg, 650 mg, Oral, Q4H PRN, Jayde Basurto MD    acetaminophen (TYLENOL) tablet 975 mg, 975 mg, Oral, Q6H PRN, Jayde Basurto MD    aluminum-magnesium hydroxide-simethicone (MYLANTA) 200-200-20 mg/5 mL oral suspension 30 mL, 30 mL, Oral, Q4H PRN, Jayde Basurto MD    benztropine (COGENTIN) injection 1 mg, 1 mg, Intramuscular, Q6H PRN, Nicholas Dubon MD    benztropine (COGENTIN) tablet 1 mg, 1 mg, Oral, Q6H PRN, Nicholas Dubon MD    haloperidol (HALDOL) tablet 5 mg, 5 mg, Oral, Q8H PRN, Nicholas Dubon MD    haloperidol lactate (HALDOL) injection 5 mg, 5 mg, Intramuscular, Q8H PRN, Nicholas Dubon MD    hydrOXYzine HCL (ATARAX) tablet 25 mg, 25 mg, Oral, Q6H PRN, Nicholas Dubon MD    LORazepam (ATIVAN) 2 mg/mL injection 1 mg, 1 mg, Intramuscular, Q8H PRN, Nicholas Dubon MD    LORazepam (ATIVAN) tablet 1 mg, 1 mg, Oral, Q8H PRN, Nicholas Dubon MD    magnesium hydroxide (MILK OF MAGNESIA) 400 mg/5 mL oral suspension 30 mL, 30 mL, Oral, Daily PRN, Nicholas Dubon MD    risperiDONE (RisperDAL M-TABS) dispersible tablet 1 mg, 1 mg, Oral, Q8H PRN, Nicholas Dubon MD    traZODone (DESYREL) tablet 50 mg, 50 mg, Oral, HS PRN, Nicholas Dubon MD    No Known Allergies    Review of Systems   Constitutional: Negative for activity change, appetite change, chills, fatigue and fever  HENT: Negative for congestion, ear discharge, hearing loss, postnasal drip, rhinorrhea, sore throat and trouble swallowing  Eyes: Negative for photophobia, pain and discharge  Respiratory: Negative for cough, chest tightness, shortness of breath and wheezing  Cardiovascular: Negative for chest pain, palpitations and leg swelling  Gastrointestinal: Negative for abdominal distention, blood in stool, constipation, diarrhea and vomiting  Endocrine: Negative for cold intolerance, polydipsia and polyphagia  Genitourinary: Negative for difficulty urinating, flank pain and urgency  Musculoskeletal: Negative for arthralgias, gait problem and neck pain  Skin: Negative for color change and rash  Allergic/Immunologic: Negative for immunocompromised state  Neurological: Negative for dizziness, tremors, seizures, facial asymmetry, speech difficulty, weakness and light-headedness     Psychiatric/Behavioral: Positive for behavioral problems  The patient is nervous/anxious  Objective   Vitals: Blood pressure 124/80, pulse 76, temperature 98 5 °F (36 9 °C), temperature source Temporal, resp  rate 16, height 5' 10" (1 778 m), weight 62 9 kg (138 lb 9 6 oz), SpO2 97 %  Physical Exam   General- Awake, and alert, NAD  HEENT: PERRLA, EOMI, sclera anicteric, moist mucous membranes, tongue mucosa without lesions  Neck: supple, no JVD, lymphadenopathy, thyromegaly  Heart: Regular rate and rhythm, S1S2 present  No murmur, rub or gallop  Lungs; Clear to auscultation bilaterally  No wheezing, crackles or rhonchi  No accessory muscle use or respiratory distress  Abdomen: soft, non-tender, non-distended, NABS  No guarding or rebound  No peritoneal sound or mass  Extremities: no clubbing, cyanosis, or edema  2+ pedal pulses bilaterally  Full range of motion  Neurologic:  Cranial nerves II-XII intact  Strength and sensation globally intact  Speech fluent and goal directed  Awake, alert and oriented x 3  Skin: warm and dry  No petechiae, purpura or rash  Assessment/Plan     Assessment:  Depression with suicidal ideation  Anxiety    Plan:  Pt in under 201 commitment  Monitor in [facility]  F/U psychiatrist recommendations  Need baseline CBC, BMP, TSH and EKG    Code Status: Level 1 - Full Code      Counseling / Coordination of Care  Total floor / unit time spent today 30 minutes  Greater than 50% of total time was spent with the patient and / or family counseling and / or coordination of care  Portions of the record may have been created with voice recognition software  Occasional wrong word or "sound a like" substitutions may have occurred due to the inherent limitations of voice recognition software  Read the chart carefully and recognize, using context, where substitutions have occurred

## 2019-08-23 NOTE — QUICK NOTE
Psychiatric Evaluation - Behavioral Health   [place] 22 y o  male MRN: [id number]  Unit/Bed#: [facility] 340-01 Encounter: [id number]    Assessment/Plan   Principal Problem:    Major depressive disorder, single episode, severe without psychotic features ([facility] [address] )    Plan:   Zoloft 25mg today with plan of increasing to 50mg tomorrow for mood  Encouraged to attend group sessions and to socialize with others  Chief Complaint: "I was feeling depressed and was having suicidal thoughts"    History of Present Illness     Deena Wray is a 22 y o  male presenting with feelings of depression and anxiety accompanied with suicidal and homicidal thoughts  The patient states that he has been feeling depressed for the last year with a recent worsening over the last month after his girlfriend broke up with him  He states that over the last month he has been depressed and at one point punched the floor multiple times until his knuckles bled  The patient was eventually brought in to the ER on  08/21 by his mother who was concerned for his safety  The patient admits to suicidal ideation with a plan to jump off a bridge or to strangle himself and homicidal ideation with no specific person in mind  Deena Wray has never seen a psychiatrist or therapist and has never tried any psychiatric medications  The patient states that recently his sleep has been poor, he has lost 13 lbs over the last few months despite a good appetite, and that he has had decreased motivation  He expresses interest in drawing and painting which he has utilized as a coping strategy for his feelings  He has had feelings of guilt and lack of self worth, but denies fouzia and hallucinations  Deena Wray has tried marijuana one time about a year ago, but has not smoked it since and has never tried any other recreational drugs  He drinks alcohol socially and has never used tobacco  He drinks one cup of coffee a day   The patient expresses good support from his mother, but a strained relationship with his father who "does not understand what I am going through"  The patient had never been hospitalized for psychiatric illness before  Radha Rivers was admitted on a voluntary 201 basis for depression with suicidal and homicidal ideation  Primary complaints include: anxiety, depression worse, difficulty sleeping, feeling depressed, feeling suicidal and relationship difficulties  Onset of symptoms was gradual starting 1 month ago with gradually worsening course since that time  Psychosocial Stressors: family, occupational, and school        Psychiatric Review Of Systems:  sleep: decreased sleep  appetite changes: no  weight changes: yes, loss of 13 lbs over the last few months despite no change to appetitie  energy/anergy: no  interest/pleasure/anhedonia: no  somatic symptoms: no  anxiety/panic: yes  fouzia: no  guilty/hopeless: yes  self injurious behavior/risky behavior: yes, patient punched floor until knuckles bled over the last month    Historical Information     Past Psychiatric History:   Patient has never seen a psychiatrist or therapist  Past Suicide attempts: none  Past Violent behavior: no  Past Psychiatric medication trial: none    Substance Abuse History:  Social History     Tobacco History     Smoking Status  Never Smoker    Smokeless Tobacco Use  Never Used    Tobacco Comment  no tobacco/smoke exposure          Alcohol History     Alcohol Use Status  Yes, socially          Drug Use     Drug Use Status  No current drug use          Sexual Activity     Sexually Active  Yes with females          Activities of Daily Living    Not Asked               Additional Substance Use Detail     Questions Responses    Substance Use Assessment Substance use within the past 12 months    Alcohol Use Frequency Denies use in past 12 months    Cannabis frequency Once about a year ago    Comment: Never used on 8/22/2019     Heroin Frequency Denies use in past 12 months    Cocaine frequency Never used    Comment: Never used on 8/22/2019     Crack Cocaine Frequency Denies use in past 12 months    Methamphetamine Frequency Denies use in past 12 months    Narcotic Frequency Denies use in past 12 months    Benzodiazepine Frequency Denies use in past 12 months    Amphetamine frequency Denies use in past 12 months    Barbituate Frequency Denies use use in past 12 months    Inhalant frequency Never used    Comment: Never used on 8/22/2019     Hallucinogen frequency Never used    Comment: Never used on 8/22/2019     Ecstasy frequency Never used    Comment: Never used on 8/22/2019     Other drug frequency Never used    Comment: Never used on 8/22/2019     Opiate frequency Denies use in past 12 months    Last reviewed by Sheila Eubanks RN on 8/22/2019        I have assessed this patient for substance use within the past 12 months    Family Psychiatric History:   Psychiatric Illness Paternal grandfather Illness: MDD and Substance Abuse Maternal grandmother Illness: MDD    Social History:  Education: High School Graduate, currently in college at [address] [place] [address] Road: none  Marital history: single  Living arrangement, social support: Good support from mother  Strained relationship with father  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  Other Pertinent History: None      Traumatic History:   Abuse: None  Other Traumatic Events: None    Past Medical History:   Diagnosis Date    Pneumonia     Sleep difficulties        Medical Review Of Systems:  A comprehensive review of systems was negative      Meds/Allergies   current meds:   Current Facility-Administered Medications   Medication Dose Route Frequency    acetaminophen (TYLENOL) tablet 650 mg  650 mg Oral Q6H PRN    acetaminophen (TYLENOL) tablet 650 mg  650 mg Oral Q4H PRN    acetaminophen (TYLENOL) tablet 975 mg  975 mg Oral Q6H PRN    aluminum-magnesium hydroxide-simethicone (MYLANTA) 200-200-20 mg/5 mL oral suspension 30 mL  30 mL Oral Q4H PRN    benztropine (COGENTIN) injection 1 mg  1 mg Intramuscular Q6H PRN    benztropine (COGENTIN) tablet 1 mg  1 mg Oral Q6H PRN    haloperidol (HALDOL) tablet 5 mg  5 mg Oral Q8H PRN    haloperidol lactate (HALDOL) injection 5 mg  5 mg Intramuscular Q6H PRN    hydrOXYzine HCL (ATARAX) tablet 25 mg  25 mg Oral Q6H PRN    LORazepam (ATIVAN) 2 mg/mL injection 1 mg  1 mg Intramuscular Q8H PRN    LORazepam (ATIVAN) tablet 1 mg  1 mg Oral Q8H PRN    magnesium hydroxide (MILK OF MAGNESIA) 400 mg/5 mL oral suspension 30 mL  30 mL Oral Daily PRN    OLANZapine (ZyPREXA ZYDIS) dispersible tablet 10 mg  10 mg Oral Q3H PRN    OLANZapine (ZyPREXA) IM injection 10 mg  10 mg Intramuscular Q3H PRN    risperiDONE (RisperDAL M-TABS) dispersible tablet 1 mg  1 mg Oral Q8H PRN    sertraline (ZOLOFT) tablet 25 mg  25 mg Oral Once    [START ON 8/24/2019] sertraline (ZOLOFT) tablet 50 mg  50 mg Oral Daily    traZODone (DESYREL) tablet 50 mg  50 mg Oral HS PRN     No Known Allergies    Objective   Vital signs in last 24 hours:  Temp:  [97 9 °F (36 6 °C)-98 5 °F (36 9 °C)] 97 9 °F (36 6 °C)  HR:  [67-76] 67  Resp:  [16-18] 16  BP: (124-129)/(34-80) 128/74    No intake or output data in the 24 hours ending 08/23/19 1110    Mental Status Evaluation:  Appearance:  casually dressed and thin & gaunt looking   Behavior:  guarded   Speech:  tangential   Mood:  anxious and depressed   Affect:  blunted   Language: naming objects and repeating phrases   Thought Process:  disorganized and tangential   Thought Content:  normal   Perceptual Disturbances: None   Risk Potential: Suicidal Ideations with plan to jump off bridge and strangle himself, Homicidal Ideations without plan and Potential for Aggression No   Sensorium:  Oriented to person, place and time/date   Cognition:  grossly intact   Consciousness:  alert and awake    Attention: attention span appeared shorter than expected for age Intellect: normal   Fund of Knowledge: awareness of current events     Insight:  limited   Judgment: limited   Muscle Strength and Tone: Normal   Gait/Station: normal gait/station   Motor Activity: no abnormal movements     Lab Results:   CBC:   Lab Results   Component Value Date    WBC 6 00 08/23/2019    HGB 15 1 08/23/2019    HCT 44 6 08/23/2019    MCV 87 08/23/2019     08/23/2019    MCH 29 5 08/23/2019    MCHC 33 9 08/23/2019    RDW 13 0 08/23/2019    MPV 8 7 (L) 08/23/2019   , BMP/CMP:   Lab Results   Component Value Date    K 4 4 08/23/2019    CL 99 08/23/2019    CO2 31 (H) 08/23/2019    BUN 14 08/23/2019    CREATININE 1 24 08/23/2019    CALCIUM 9 9 08/23/2019    AST 33 08/23/2019    ALT 15 08/23/2019    ALKPHOS 75 08/23/2019    EGFR 84 08/23/2019     TSH: 1 14 at 05:58 on 08/23/19    Imaging Studies: None    EKG, Pathology, and Other Studies: None

## 2019-08-23 NOTE — H&P
Psychiatric Evaluation - Behavioral Health     Identification Data:Javid Holt 22 y o  male MRN: [id number]  Unit/Bed#: [facility] 340-01 Encounter: [id number]    Chief Complaint: depression, suicidal ideation and homicidal ideation    History of Present Illness     Lenny Self is a 22 y o  male with no history of psychiatric treatment who was admitted to the inpatient psychiatric unit on a voluntary 201 commitment basis due to depression, suicidal ideation with plan to jump off a bridge or strangle self and homicidal ideation  Symptoms prior to admission included worsening depression, suicidal ideation, hopelessness, helplessness, poor concentration, difficulty sleeping, increased irritability and anxiety symptoms  Onset of symptoms was gradual starting 1 year ago with progressively worsening course over the last month  Stressors preceding admission included family issues and break up with girlfriend  Stacy Reardon was brought in to ED by his mother due to worsening depression and thoughts of self harm  He reported poor motivation, feeling angry at strangers and thoughts of fighting others  His mother was concerned about his safety and brought him in to ED    On initial evaluation after admission to the inpatient psychiatric unit Stacy Reardon was still feeling depressed and still had suicidal thoughts, but felt safe on the inpatient unit He also seemed very anxious and preoccupied with his school work  He reluctantly agreed to try an antidepressant to help with his symptoms      Psychiatric Review Of Systems:    Sleep changes: yes, decreased  Appetite changes: yes, decreased  Weight changes: yes, weight loss 13 lb  Energy/anergy: yes, decreased  Interest/pleasure/anhedonia: yes, decreased  Somatic symptoms: no  Anxiety/panic: worrying  Marina: no  Guilty/hopeless: yes  Self injurious behavior/risky behavior: yes, history of punching floor last month  Suicidal ideation: yes, plan to jump off a bridge or strangle self (no access to guns at home)  Homicidal ideation: yes, towards "people"  Auditory hallucinations: no  Visual hallucinations: no  Other hallucinations: no  Delusional thinking: no  Eating disorder history: no  Obsessive/compulsive symptoms: no    Historical Information     Past Psychiatric History:     Past Inpatient Psychiatric Treatment:   No history of past inpatient psychiatric admissions  Past Outpatient Psychiatric Treatment:    No history of past outpatient psychiatric treatment  Has never seen a psychiatrist prior to admission    Past Suicide Attempts: no history of suicide attempts, but has a history of self abusive behavior by hitting floor  Past Violent Behavior: no  Past Psychiatric Medication Trials: none     Substance Abuse History:    Social History     Tobacco History     Smoking Status  Never Smoker    Smokeless Tobacco Use  Never Used    Tobacco Comment  no tobacco/smoke exposure          Alcohol History     Alcohol Use Status  Yes          Drug Use     Drug Use Status  No          Sexual Activity     Sexually Active  Not Currently          Activities of Daily Living    Not Asked               Additional Substance Use Detail     Questions Responses    Substance Use Assessment Substance use within the past 12 months    Alcohol Use Frequency Denies use in past 12 months    Cannabis frequency Never used    Comment: Never used on 8/22/2019     Heroin Frequency Denies use in past 12 months    Cocaine frequency Never used    Comment: Never used on 8/22/2019     Crack Cocaine Frequency Denies use in past 12 months    Methamphetamine Frequency Denies use in past 12 months    Narcotic Frequency Denies use in past 12 months    Benzodiazepine Frequency Denies use in past 12 months    Amphetamine frequency Denies use in past 12 months    Barbiturate Frequency Denies use in past 12 months    Inhalant frequency Never used    Comment: Never used on 8/22/2019     Hallucinogen frequency Never used    Comment: Never used on 8/22/2019 Ecstasy frequency Never used    Comment: Never used on 8/22/2019     Other drug frequency Never used    Comment: Never used on 8/22/2019     Opiate frequency Denies use in past 12 months    Last reviewed by Husam Morley RN on 8/22/2019        I have assessed this patient for substance use within the past 12 months    Alcohol use: occasional, social use  Recreational drug use:   Cocaine:  denies use  Heroin:  denies use  Marijuana:  denies current use, tried in the past on few occasions  Other drugs: denies use   Longest clean time: several years  History of Inpatient/Outpatient rehabilitation program: no  Smoking history: denies use  Use of caffeine: 1 cup of coffee per day    Family Psychiatric History:     Psychiatric Illness:  Grandmother - depression, Grandfather - depression  Substance Abuse:   Aunt - alcohol abuse  Suicide Attempts:  no family history of suicide attempts    Social History:    Education: high school graduate and student at [employer])  Learning Disabilities: none  Marital History: single  Children: none  Living Arrangement: lives in home with father, mother and 2 brothers  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 [employer]  Functioning Relationships: mother is supportive, poor relationship with father  Legal History: none   History: None    Traumatic History:     Abuse: none  Other Traumatic Events: none     Past Medical History:    History of Seizures: no  History of Head injury with loss of consciousness: no    Past Medical History:   Diagnosis Date    Pneumonia     Sleep difficulties      Past Surgical History:   Procedure Laterality Date    CIRCUMCISION      WISDOM TOOTH EXTRACTION         Medical Review Of Systems:    A comprehensive review of systems was negative except for: Behavioral/Psych: positive for anxiety, depression, homicidal ideation, hopelessness, irritability, sleep disturbance and suicidal ideation    Allergies:    No Known Allergies    Medications: All current active medications have been reviewed  Medications prior to admission:    None       OBJECTIVE:    Vital signs in last 24 hours:    Temp:  [97 9 °F (36 6 °C)-98 5 °F (36 9 °C)] 97 9 °F (36 6 °C)  HR:  [67-76] 67  Resp:  [16-18] 16  BP: (124-129)/(34-80) 128/74    No intake or output data in the 24 hours ending 08/23/19 1043     Mental Status Evaluation:    Appearance:  casually dressed   Behavior:  cooperative, limited eye contact   Speech:  soft, tangential   Mood:  depressed, anxious   Affect:  blunted   Language: naming objects and repeating phrases   Thought Process:  tangential   Associations: tangential associations   Thought Content:  no overt delusions   Perceptual Disturbances: no auditory hallucinations, no visual hallucinations   Risk Potential: Suicidal ideation - Yes, with plan to jump off a bridge or strangle self, contracts for safety on the unit  Homicidal ideation - None  Potential for aggression - No   Sensorium:  oriented to person, place and time/date   Memory:  recent and remote memory grossly intact   Consciousness:  alert and awake   Attention: decreased concentration and decreased attention span   Intellect: average   Fund of Knowledge: awareness of current events: yes  past history: yes  vocabulary: normal   Insight:  impaired   Judgment: impaired   Muscle Strength Muscle Tone: normal  normal   Gait/Station: normal gait/station, normal balance   Motor Activity: no abnormal movements       Laboratory Results: I have personally reviewed all pertinent laboratory/tests results      Admission Labs:   Admission on 08/22/2019   Component Date Value    WBC 08/23/2019 6 00     RBC 08/23/2019 5 14     Hemoglobin 08/23/2019 15 1     Hematocrit 08/23/2019 44 6     MCV 08/23/2019 87     MCH 08/23/2019 29 5     MCHC 08/23/2019 33 9     RDW 08/23/2019 13 0     MPV 08/23/2019 8 7*    Platelets 92/07/6498 206     Neutrophils Relative 08/23/2019 52     Lymphocytes Relative 08/23/2019 38     Monocytes Relative 08/23/2019 8     Eosinophils Relative 08/23/2019 3     Basophils Relative 08/23/2019 1     Neutrophils Absolute 08/23/2019 3 10     Lymphocytes Absolute 08/23/2019 2 30     Monocytes Absolute 08/23/2019 0 50     Eosinophils Absolute 08/23/2019 0 20     Basophils Absolute 08/23/2019 0 00     Sodium 08/23/2019 139     Potassium 08/23/2019 4 4     Chloride 08/23/2019 99     CO2 08/23/2019 31*    ANION GAP 08/23/2019 9     BUN 08/23/2019 14     Creatinine 08/23/2019 1 24     Glucose 08/23/2019 87     Glucose, Fasting 08/23/2019 87     Calcium 08/23/2019 9 9     AST 08/23/2019 33     ALT 08/23/2019 15     Alkaline Phosphatase 08/23/2019 75     Total Protein 08/23/2019 7 1     Albumin 08/23/2019 4 5     Total Bilirubin 08/23/2019 0 70     eGFR 08/23/2019 84     Cholesterol 08/23/2019 163     Triglycerides 08/23/2019 105     HDL, Direct 08/23/2019 41     LDL Calculated 08/23/2019 101     Non-HDL-Chol (CHOL-HDL) 08/23/2019 122     TSH 3RD GENERATON 08/23/2019 1 140     Ventricular Rate 08/22/2019 62     Atrial Rate 08/22/2019 62     FL Interval 08/22/2019 148     QRSD Interval 08/22/2019 86     QT Interval 08/22/2019 394     QTC Interval 08/22/2019 399     P Axis 08/22/2019 71     QRS [place] 08/22/2019 101     T Wave Axis 08/22/2019 51     Ventricular Rate 08/22/2019 62     Atrial Rate 08/22/2019 62     FL Interval 08/22/2019 148     QRSD Interval 08/22/2019 86     QT Interval 08/22/2019 394     QTC Interval 08/22/2019 399     P Axis 08/22/2019 71     QRS [place] 08/22/2019 101     T Wave Axis 08/22/2019 51    Drug Screen:   Lab Results   Component Value Date    AMPMETHUR Negative 08/21/2019    BARBTUR Negative 08/21/2019    BDZUR Negative 08/21/2019    THCUR Negative 08/21/2019    COCAINEUR Negative 08/21/2019    METHADONEUR Negative 08/21/2019    OPIATEUR Negative 08/21/2019 PCPUR Negative 08/21/2019       Imaging Studies: No results found  Code Status: Level 1 - Full Code  Advance Directive and Living Will: <no information>    Assessment/Plan   Principal Problem:    Major depressive disorder, single episode, severe without psychotic features ([facility] [address] )    Patient Strengths: general fund of knowledge, motivated, patient is on a voluntary commitment, supportive mother     Patient Barriers: break up with girlfriend, low self esteem    Treatment Plan:     Planned Treatment and Medication Changes: All current active medications have been reviewed    Encourage group therapy, milieu therapy and occupational therapy  [address] checks every 7 minutes  Start Zoloft 25 mg daily and titrate dose to 50 mg daily to help with depressive symptoms    Current medications:    Current Facility-Administered Medications:  acetaminophen 650 mg Oral Q6H PRN Shellie Castillo, MD   acetaminophen 650 mg Oral Q4H PRN Shellie Castillo, MD   acetaminophen 975 mg Oral Q6H PRN Shellie Castillo MD   aluminum-magnesium hydroxide-simethicone 30 mL Oral Q4H PRN Shellie Castillo MD   benztropine 1 mg Intramuscular Q6H PRN Anushka Schmitz MD   benztropine 1 mg Oral Q6H PRN Anushka Schmitz MD   haloperidol 5 mg Oral Q8H PRN Shellie Castillo MD   haloperidol lactate 5 mg Intramuscular Q6H PRN Anushka Schmitz MD   hydrOXYzine HCL 25 mg Oral Q6H PRN Shellie Castillo MD   LORazepam 1 mg Intramuscular Q8H PRN Shellie Castillo MD   LORazepam 1 mg Oral Q8H PRN Shellie Castillo MD   magnesium hydroxide 30 mL Oral Daily PRN Shellie Castillo MD   OLANZapine 10 mg Oral Q3H PRN Anushka Schmitz MD   OLANZapine 10 mg Intramuscular Q3H PRN Anushka Schmitz MD   risperiDONE 1 mg Oral Q8H PRN Shellie Castillo MD   sertraline 25 mg Oral Once Anushka Schmitz MD   [START ON 8/24/2019] sertraline 50 mg Oral Daily Anushka Schmitz MD   traZODone 50 mg Oral HS PRN Shellie Castillo MD       Risks / Benefits of Treatment:    Risks, benefits, and possible side effects of medications explained to patient including risk of suicidality and serotonin syndrome related to treatment with antidepressants  The patient verbalizes understanding and agreement for treatment  Counseling / Coordination of Care:    Patient's presentation on admission and proposed treatment plan discussed with treatment team   Diagnosis, medication changes and treatment plan reviewed with patient  Stressors preceding admission discussed with patient including family issues and break up with girlfriend  Events leading to admission reviewed with patient  Inpatient Psychiatric Certification:    Estimated length of stay: 4 midnights    Based upon physical, mental and social evaluations, I certify that inpatient psychiatric services are medically necessary for this patient for a duration of 4 midnights for the treatment of Major depressive disorder, single episode, severe without psychotic features ([facility] [address] )    Available alternative community resources do not meet the patient's mental health care needs  I further attest that an established written individualized plan of care has been implemented and is outlined in the patient's medical records    The patient has been released from the Emergency Department and medically cleared as per Emergency Department documentation for psychiatric admission for Major depressive disorder, single episode, severe without psychotic features ([facility] [address] )      Theodore Wilde MD 08/23/19

## 2019-08-23 NOTE — DISCHARGE SUMMARY
Discharge Summary - [address] [facility] [address] 22 y o  male MRN: [id number]  Unit/Bed#: -01 Encounter: [id number]     Admission Date: 8/22/2019         Discharge Date: 8/27/2019    Attending Psychiatrist: Suri Mayo MD    Reason for Admission/HPI:     Marlene Coburn is a 22 y o  male with no history of psychiatric treatment who was admitted to the inpatient psychiatric unit on a voluntary 201 commitment basis due to depression, suicidal ideation with plan to jump off a bridge or strangle self and homicidal ideation      Symptoms prior to admission included worsening depression, suicidal ideation, hopelessness, helplessness, poor concentration, difficulty sleeping, increased irritability and anxiety symptoms  Onset of symptoms was gradual starting 1 year ago with progressively worsening course over the last month  Stressors preceding admission included family issues and break up with girlfriend  Lucio Pina was brought in to ED by his mother due to worsening depression and thoughts of self harm  He reported poor motivation, feeling angry at strangers and thoughts of fighting others  His mother was concerned about his safety and brought him in to ED     On initial evaluation after admission to the inpatient psychiatric unit Lucio Pina was still feeling depressed and still had suicidal thoughts, but felt safe on the inpatient unit He also seemed very anxious and preoccupied with his school work  He reluctantly agreed to try an antidepressant to help with his symptoms  Past Psychiatric History:      Past Inpatient Psychiatric Treatment:   No history of past inpatient psychiatric admissions  Past Outpatient Psychiatric Treatment:    No history of past outpatient psychiatric treatment  Has never seen a psychiatrist prior to admission    Past Suicide Attempts: no history of suicide attempts, but has a history of self abusive behavior by hitting floor  Past Violent Behavior: no  Past Psychiatric Medication Trials: none     Substance Abuse History:    Alcohol use: occasional, social use  Recreational drug use:   Cocaine:        denies use  Heroin:             denies use  Marijuana:        denies current use, tried in the past on few occasions  Other drugs:   denies use   Longest clean time: several years  History of Inpatient/Outpatient rehabilitation program: no  Smoking history: denies use  Use of caffeine: 1 cup of coffee per day     Family Psychiatric History:      Psychiatric Illness:     Grandmother - depression, Grandfather - depression  Substance Abuse: Aunt - alcohol abuse  Suicide Attempts:       no family history of suicide attempts     Social History:     Education: high school graduate and student at [employer]  Learning Disabilities: none  Marital History: single  Children: none  Living Arrangement: lives in home with father, mother and 2 brothers  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 [employer]  Functioning Relationships: mother is supportive, poor relationship with father  Legal History: none   History: None     Traumatic History:      Abuse: none  Other Traumatic Events: none      Past Medical History:     History of Seizures: no  History of Head injury with loss of consciousness: no    Past Medical History:   Diagnosis Date    Pneumonia     Sleep difficulties      Past Surgical History:   Procedure Laterality Date    CIRCUMCISION      WISDOM TOOTH EXTRACTION         Medications: All current active medications have been reviewed      prior to admission:    None     Allergies:     No Known Allergies    Objective     Vital signs in last 24 hours:    Temp:  [98 3 °F (36 8 °C)-98 4 °F (36 9 °C)] 98 4 °F (36 9 °C)  HR:  [] 107  Resp:  [16] 16  BP: (141-155)/(70-72) 155/72    No intake or output data in the 24 hours ending 08/27/19 [address] [place] [address],[address] Course:     Santa Torres was admitted to the inpatient psychiatric unit and started on Behavioral Health checks every 7 minutes  During the hospitalization he was encouraged to attend individual therapy, group therapy, milieu therapy and occupational therapy  Psychiatric medications were initiated during the hospital stay  To address depressive symptoms and anxiety symptoms, Ade Norman was treated with antidepressant Zoloft  Medication doses were gradually titrated during the hospital course  Zoloft was started and titrated to 50 mg daily  Prior to beginning of treatment medications risks and benefits and possible side effects including risk of suicidality and serotonin syndrome related to treatment with antidepressants were reviewed with Ade Norman  He verbalized understanding and agreement for treatment  Upon admission Ade Norman was seen by medical service for medical clearance for inpatient treatment and medical follow up  Javid's symptoms slowly improved over the hospital course  Initially after admission he was still feeling depressed, anxious and overwhelmed  He also still had suicidal thoughts  With adjustment of medications and therapeutic milieu his symptoms gradually resolved  At the end of treatment Ade Norman was doing much better  His mood was significantly improved at the time of discharge  He denied suicidal ideation, intent or plan at the time of discharge and denied homicidal ideation, intent or plan at the time of discharge  There was no overt psychosis at the time of discharge  her was participating appropriately in milieu at the time of discharge  Sleep and appetite were improved  He was tolerating medications and was not reporting any significant side effects at the time of discharge  Since Ade Norman was doing well at the end of the hospitalization, treatment team felt that he could be safely discharged to outpatient care  We felt that Ade Norman achieved the maximum benefit of inpatient stay at that point and could now follow up with outpatient treatment   Prior to discharge  spoke with Javid's mother to address support and his readiness for discharge  Javid's mother felt comfortable with his release from the hospital and was going to provide support to him after discharge  Lucio Pina also felt stable and ready for discharge at the end of the hospital stay  The outpatient follow up with [facility] for psychiatric follow up was arranged by the unit  upon discharge  Mental Status at Time of Discharge:     Appearance:  age appropriate, casually dressed   Behavior:  pleasant, cooperative, calm   Speech:  normal rate, normal volume, normal pitch   Mood:  euthymic   Affect:  normal range and intensity, appropriate   Thought Process:  organized, goal directed   Associations: intact associations   Thought Content:  no overt delusions   Perceptual Disturbances: no auditory hallucinations, no visual hallucinations   Risk Potential: Suicidal ideation - None  Homicidal ideation - None  Potential for aggression - No   Sensorium:  oriented to person, place, time/date and situation   Memory:  recent and remote memory grossly intact   Consciousness:  alert and awake   Attention: attention span and concentration are age appropriate   Insight:  improved and fair   Judgment: improved and fair   Gait/Station: normal gait/station, normal balance   Motor Activity: no abnormal movements       Admission Diagnosis:    Principal Problem:    Major depressive disorder, single episode, severe without psychotic features [facility]    Discharge Diagnosis:     Principal Problem:    Major depressive disorder, single episode, severe without psychotic features ([facility] )  Resolved Problems:    * No resolved hospital problems  *    Lab Results: I have personally reviewed all pertinent laboratory/tests results      Most Recent Labs:   Lab Results   Component Value Date    WBC 6 00 08/23/2019    RBC 5 14 08/23/2019    HGB 15 1 08/23/2019    HCT 44 6 08/23/2019     08/23/2019    RDW 13 0 08/23/2019 NEUTROABS 3 10 08/23/2019    SODIUM 139 08/23/2019    K 4 4 08/23/2019    CL 99 08/23/2019    CO2 31 (H) 08/23/2019    BUN 14 08/23/2019    CREATININE 1 24 08/23/2019    GLUC 87 08/23/2019    GLUF 87 08/23/2019    CALCIUM 9 9 08/23/2019    AST 33 08/23/2019    ALT 15 08/23/2019    ALKPHOS 75 08/23/2019    TP 7 1 08/23/2019    ALB 4 5 08/23/2019    TBILI 0 70 08/23/2019    CHOLESTEROL 163 08/23/2019    HDL 41 08/23/2019    TRIG 105 08/23/2019    LDLCALC 101 08/23/2019    NONHDLC 122 08/23/2019    FIR5PBJPSRAX 1 140 08/23/2019    RPR Non-Reactive 08/23/2019    HGBA1C 5 0 08/23/2019    EAG 97 08/23/2019   Drug Screen:   Lab Results   Component Value Date    AMPMETHUR Negative 08/21/2019    BARBTUR Negative 08/21/2019    BDZUR Negative 08/21/2019    THCUR Negative 08/21/2019    COCAINEUR Negative 08/21/2019    METHADONEUR Negative 08/21/2019    OPIATEUR Negative 08/21/2019    PCPUR Negative 08/21/2019   EKG   Lab Results   Component Value Date    VENTRATE 62 08/22/2019    ATRIALRATE 62 08/22/2019    PRINT 148 08/22/2019    QRSDINT 86 08/22/2019    QTINT 394 08/22/2019    QTCINT 399 08/22/2019    PAXIS 71 08/22/2019    QRSAXIS 101 08/22/2019    TWAVEAXIS 51 08/22/2019       Discharge Medications:    See after visit summary for all reconciled discharge medications provided to patient and family  Discharge instructions/Information to patient and family:     See after visit summary for information provided to patient and family  Provisions for Follow-Up Care:    See after visit summary for information related to follow-up care and any pertinent home health orders  Discharge Statement:    I spent 38 minutes discharging the patient  This time was spent on the day of discharge  I had direct contact with the patient on the day of discharge       Additional documentation is required if more than 30 minutes were spent on discharge:    I reviewed with Boston Mchugh importance of compliance with medications and outpatient treatment after discharge  I discussed the medication regimen and possible side effects of the medications with Leatha Norris prior to discharge  At the time of discharge he was tolerating psychiatric medications  I discussed outpatient follow up with Leatha Norris  I reviewed with Leatha Norris crisis plan and safety plan upon discharge  Leatha Norris was competent to understand risks and benefits of withholding information and risks and benefits of his actions      Karin Dong MD 08/27/19

## 2019-08-23 NOTE — PROGRESS NOTES
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

## 2019-08-24 LAB — RPR SER QL: NORMAL

## 2019-08-24 PROCEDURE — 99232 SBSQ HOSP IP/OBS MODERATE 35: CPT | Performed by: NURSE PRACTITIONER

## 2019-08-24 RX ADMIN — SERTRALINE HYDROCHLORIDE 50 MG: 50 TABLET ORAL at 09:07

## 2019-08-24 NOTE — PROGRESS NOTES
Patient had visitors and visit went well  Patient appears a little brighter and less tearful  Patient denies SI/HI and denies A/V hallucinations  Patient social with peers and playing cards

## 2019-08-24 NOTE — PLAN OF CARE
Problem: Risk for Self Injury/Neglect  Goal: Verbalize thoughts and feelings  Description  Interventions:  - Assess and re-assess patient's lethality and potential for self-injury  - Engage patient in 1:1 interactions, daily, for a minimum of 15 minutes  - Encourage patient to express feelings, fears, frustrations, hopes  - Establish rapport/trust with patient   Outcome: Progressing  Goal: Refrain from harming self  Description  Interventions:  - Monitor patient closely, per order  - Develop a trusting relationship  - Supervise medication ingestion, monitor effects and side effects   Outcome: Progressing     Problem: Depression  Goal: Refrain from isolation  Description  Interventions:  - Develop a trusting relationship   - Encourage socialization   Outcome: Progressing  Goal: Attend and participate in unit activities, including therapeutic, recreational, and educational groups  Description  Interventions:  - Provide therapeutic and educational activities daily, encourage attendance and participation, and document same in the medical record   Outcome: Progressing     Problem: Risk for Violence/Aggression Toward Others  Goal: Treatment Goal: Refrain from acts of violence/aggression during length of stay, and demonstrate improved impulse control at the time of discharge  Outcome: Progressing  Goal: Refrain from harming others  Outcome: Progressing     Problem: Nutrition/Hydration-ADULT  Goal: Nutrient/Hydration intake appropriate for improving, restoring or maintaining nutritional needs  Description  Monitor and assess patient's nutrition/hydration status for malnutrition  Collaborate with interdisciplinary team and initiate plan and interventions as ordered  Monitor patient's weight and dietary intake as ordered or per policy  Utilize nutrition screening tool and intervene as necessary  Determine patient's food preferences and provide high-protein, high-caloric foods as appropriate       INTERVENTIONS:  - Monitor oral intake, urinary output, labs, and treatment plans  - Assess nutrition and hydration status and recommend course of action  - Evaluate amount of meals eaten  - Assist patient with eating if necessary   - Allow adequate time for meals  - Recommend/ encourage appropriate diets, oral nutritional supplements, and vitamin/mineral supplements  - Order, calculate, and assess calorie counts as needed  - Recommend, monitor, and adjust tube feedings and TPN/PPN based on assessed needs  - Assess need for intravenous fluids  - Provide specific nutrition/hydration education as appropriate  - Include patient/family/caregiver in decisions related to nutrition  Outcome: Progressing     Problem: SELF HARM/SUICIDALITY  Goal: Will have no self-injury during hospital stay  Description  INTERVENTIONS:  - Q 15 MINUTES: Routine safety checks  - Q WAKING SHIFT & PRN: Assess risk to determine if routine checks are adequate to maintain patient safety  - Encourage patient to participate actively in care by formulating a plan to combat response to suicidal ideation, identify supports and resources  Outcome: Progressing     Problem: SLEEP DISTURBANCE  Goal: Will exhibit normal sleeping pattern  Description  Interventions:  -  Assess the patients sleep pattern, noting recent changes  - Administer medication as ordered  - Decrease environmental stimuli, including noise, as appropriate during the night  - Encourage the patient to actively participate in unit groups and or exercise during the day to enhance ability to achieve adequate sleep at night  - Assess the patient, in the morning, encouraging a description of sleep experience  Outcome: Progressing     Problem: Ineffective Coping  Goal: Participates in unit activities  Description  Interventions:  - Provide therapeutic environment   - Provide required programming   - Redirect inappropriate behaviors   Outcome: Progressing

## 2019-08-24 NOTE — PROGRESS NOTES
Pt denies all symptoms  He states "I have a lot more energy today"  Pt is bright, friendly and cooperative  He is social with others

## 2019-08-24 NOTE — PROGRESS NOTES
Patient out on unit and social with peers  Patient denies SI/HI and denies A/V hallucinations  Patient still c/o anxiety related to school and rates his depression as a 3

## 2019-08-24 NOTE — PROGRESS NOTES
Progress Note - Behavioral Health   Colton Fabry 22 y o  male MRN: [id number]  Unit/Bed#: [facility] 340-01 Encounter: [id number]    The patient was seen for continuing care and reviewed with treatment team   Staff reports no significant change in the last 24 hours  Patient was visible and social in the milieu and pleasant and cooperative upon approach  Patient displays good eye contact with constricted affect  Patient is currently rating his depression as a 3/10 with moderate anxiety noted  Patient attributes his increased anxiety to being in the hospital and being overwhelmed with beginning school  Patient reports restless sleep, improved appetite and energy level  Patient currently denies any suicidal/homicidal ideation or auditory/visual hallucinations and does not appear to be responding to internal stimuli at this time  Patient is compliant with medications and reports no side effects at this time  Patient stated that overall he feels being in the hospital has been a very positive experience for him  Patient denies pain  Mental Status Evaluation:  Appearance:  Adequate hygiene and grooming   Behavior:  cooperative and friendly   Mood:  anxious   Affect: constricted   Speech: Normal rate and Normal volume   Thought Process:  Goal directed and coherent   Thought Content:  Does not verbalize delusional material   Perceptual Disturbances: Denies hallucinations and does not appear to be responding to internal stimuli   Risk Potential: No suicidal or homicidal ideation   Attention/Concentration attention span and concentration were age appropriate   Orientation:   Alert and oriented x3   Gait/Station: normal gait/station and normal balance   Motor Activity: No abnormal movement noted     Progress Toward Goals:  Continues to improve    Assessment/Plan    Principal Problem:    Major depressive disorder, single episode, severe without psychotic features ([facility] [address] )      Recommended Treatment: 1  Continue with pharmacotherapy, group therapy, milieu therapy and occupational therapy  2  Continue with safety checks per unit protocol  3  No medication changes at this time  The patient will be maintained on the following medications:    Current Facility-Administered Medications:  acetaminophen 650 mg Oral Q6H PRN Akilah Ocampo MD   acetaminophen 650 mg Oral Q4H PRN Akilah Ocampo, MD   acetaminophen 975 mg Oral Q6H PRN Akilah Ocampo, MD   aluminum-magnesium hydroxide-simethicone 30 mL Oral Q4H PRN Akilah Ocampo, MD   benztropine 1 mg Intramuscular Q6H PRN Monico Crowe MD   benztropine 1 mg Oral Q6H PRN Monico Crowe MD   haloperidol 5 mg Oral Q8H PRN Akilah Ocampo, MD   haloperidol lactate 5 mg Intramuscular Q6H PRN Monico Crowe MD   hydrOXYzine HCL 25 mg Oral Q6H PRN Akilah Ocampo, MD   LORazepam 1 mg Intramuscular Q8H PRN Akilah Ocampo MD   LORazepam 1 mg Oral Q8H PRN Akilah Ocampo MD   magnesium hydroxide 30 mL Oral Daily PRN Akilah Ocampo, MD   OLANZapine 10 mg Oral Q3H PRN Monico Crowe MD   OLANZapine 10 mg Intramuscular Q3H PRN Monico Crowe MD   risperiDONE 1 mg Oral Q8H PRN Akilah Ocampo MD   sertraline 50 mg Oral Daily Monico Crowe MD   traZODone 50 mg Oral HS PRN Akilah Ocampo MD       Risks, benefits and possible side effects of Medications:   Risks, benefits, and possible side effects of medications explained to patient and patient verbalizes understanding  CE Root      Portions of the record may have been created with voice recognition software  Occasional wrong word or "sound a like" substitutions may have occurred due to the inherent limitations of voice recognition software  Read the chart carefully and recognize, using context, where substitutions have occurred

## 2019-08-24 NOTE — PROGRESS NOTES
Pt asked RN if she would speak with him  Pt states he is very anxious about being in the hospital, although overall he agrees that this is a positive experience and has helped him  Pt discussed feeling overwhelmed with many recent "transitions", such as beginning school, moving into his dorm, finishing a research project, and beginning some difficult classes (organic chemistry and biology)  Pt admits that in the past he did not process his anxiety well and things built up and caused problems for him  He also admitted that at times he does have thoughts about "hurting people" but denies vehemently that he would ever harm anyone, that he does not want to and would like a therapist so he can more positively and appropriately discuss these things  Pt has good insight and is motivated to maintain mental and emotional health  In order to try to alleviate/cope with some of his anxiety, pt is planning on asking his mother to bring in his cell phone, so he can 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

## 2019-08-24 NOTE — PROGRESS NOTES
Pt denies all symptoms  He is polite, pleasant and cooperative  Pt is social with peers and has been appropriate  He states he feels much better tonight than he did earlier and that his friends are very supportive

## 2019-08-25 PROCEDURE — 99232 SBSQ HOSP IP/OBS MODERATE 35: CPT | Performed by: NURSE PRACTITIONER

## 2019-08-25 RX ADMIN — SERTRALINE HYDROCHLORIDE 50 MG: 50 TABLET ORAL at 08:32

## 2019-08-25 NOTE — PROGRESS NOTES
Progress Note - Behavioral Health   Mike Smith 22 y o  male MRN: [id number]  Unit/Bed#: [facility] 340-01 Encounter: [id number]    The patient was seen for continuing care and reviewed with treatment team   Staff reports no significant change within the last 24 hours  Patient is visible and social in the milieu  He is pleasant and cooperative upon approach and displays good eye contact with a constricted affect  Patient continues to rate his depression as a 3/10 with moderate anxiety noted  Patient stated he is hopeful for discharge soon as school starts tomorrow and he is very anxious that he will begin to fall behind on his class work  He continues to report restless sleep with adequate appetite and energy level  He currently denies any suicidal/homicidal ideation or auditory/visual hallucinations and does not appear to be responding to internal stimuli at this time  Patient is compliant with medications and no side effects noted  Denies pain  Mental Status Evaluation:  Appearance:  Adequate hygiene and grooming   Behavior:  cooperative and friendly   Mood:  anxious   Affect: constricted   Speech: Normal rate and Normal volume   Thought Process:  Goal directed and coherent   Thought Content:  Does not verbalize delusional material   Perceptual Disturbances: Denies hallucinations and does not appear to be responding to internal stimuli   Risk Potential: No suicidal or homicidal ideation   Attention/Concentration attention span and concentration were age appropriate   Orientation:   Alert and awake   Gait/Station: normal gait/station and normal balance   Motor Activity: No abnormal movement noted     Progress Toward Goals:  Continues to improve    Assessment/Plan    Principal Problem:    Major depressive disorder, single episode, severe without psychotic features ([facility] [address] )      Recommended Treatment:   1  Continue with pharmacotherapy, group therapy, milieu therapy and occupational therapy      2  Continue with safety checks per unit protocol  3  No medication changes at this time  The patient will be maintained on the following medications:    Current Facility-Administered Medications:  acetaminophen 650 mg Oral Q6H PRN Roro Sethi MD   acetaminophen 650 mg Oral Q4H PRN Roro Sethi MD   acetaminophen 975 mg Oral Q6H PRN Roro Sethi MD   aluminum-magnesium hydroxide-simethicone 30 mL Oral Q4H PRN Roro Sethi, MD   benztropine 1 mg Intramuscular Q6H PRN Wing Paci, MD   benztropine 1 mg Oral Q6H PRN Wing Paci, MD   haloperidol 5 mg Oral Q8H PRN Roro Sethi, MD   haloperidol lactate 5 mg Intramuscular Q6H PRN Wing Paci, MD   hydrOXYzine HCL 25 mg Oral Q6H PRN Roro Sethi MD   LORazepam 1 mg Intramuscular Q8H PRN Roro Sethi, MD   LORazepam 1 mg Oral Q8H PRN Roro Sethi, MD   magnesium hydroxide 30 mL Oral Daily PRN Roro Sethi, MD   OLANZapine 10 mg Oral Q3H PRN Wing Paci, MD   OLANZapine 10 mg Intramuscular Q3H PRN Wing Paci, MD   risperiDONE 1 mg Oral Q8H PRN Roro Sethi MD   sertraline 50 mg Oral Daily Wing Paci, MD   traZODone 50 mg Oral HS PRN Roro Sethi MD       Risks, benefits and possible side effects of Medications:   Risks, benefits, and possible side effects of medications explained to patient and patient verbalizes understanding  Romana Lobo, CRNP      Portions of the record may have been created with voice recognition software  Occasional wrong word or "sound a like" substitutions may have occurred due to the inherent limitations of voice recognition software  Read the chart carefully and recognize, using context, where substitutions have occurred

## 2019-08-25 NOTE — PROGRESS NOTES
Patient still feels anxious about school starting and he is missing the first few days  Patient feels he will be behind in school work  Suggested to patient that family can  his books and bring them so patient can begin reading  Suggested to patient to ask doctor for order to use laptop to access his classes and professors  Patient liked the idea and seemed less anxious  Patient denies SI and denies A/V hallucinations  Patient is medication compliant, cooperative

## 2019-08-25 NOTE — PROGRESS NOTES
Pt denies all symptoms and states his anxiety is currently low  Pt is motivated to remain well and is interested in attending outpatient therapy and continuing on medication  Pt educated on medication, Zoloft, and is receptive  He is hopeful for d/c by Tuesday

## 2019-08-25 NOTE — PROGRESS NOTES
Patient out on unit and social   Patient denies SI/HI and denies A/V hallucinations  Patient continues to complain about anxiety related to school

## 2019-08-26 PROCEDURE — 99232 SBSQ HOSP IP/OBS MODERATE 35: CPT | Performed by: PSYCHIATRY & NEUROLOGY

## 2019-08-26 RX ADMIN — SERTRALINE HYDROCHLORIDE 50 MG: 50 TABLET ORAL at 08:40

## 2019-08-26 NOTE — QUICK NOTE
Progress Note - Behavioral Health   Arlen Coronel 22 y o  male MRN: [id number]  Unit/Bed#: [facility] 340-01 Encounter: [id number]    Assessment/Plan   Principal Problem:    Major depressive disorder, single episode, severe without psychotic features ([address] )      Behavior over the last 24 hours:  Improved    Arsh Anderson reports improvement in his depression and anxiety today rating them both at a 2/10  The patient slept well last night attaining 7-8 hours of sleep  Arsh Anderson denies hallucinations, suicidal ideations, homicidal ideations, and thoughts of self harm  He has been going to group and socializing with others  Sleep: normal  Appetite: normal  Medication side effects: No  ROS: patient denies vomiting, chest pain, SOB, constipation and diarrhea    Mental Status Evaluation:  Appearance:  Dressedin hospital attire over street clothes   Behavior:  guarded   Speech:  normal pitch and normal volume   Mood:  anxious and depressed   Affect:  blunted   Thought Process:  tangential   Thought Content:  normal   Perceptual Disturbances: None   Risk Potential: Suicidal Ideations none, Homicidal Ideations none and Potential for Aggression No   Sensorium:  Oriented to person, place and time/date   Cognition:  grossly intact   Consciousness:  alert and awake    Attention: attention span appeared shorter than expected for age   Insight:  limited   Judgment: limited   Gait/Station: normal gait/station   Motor Activity: no abnormal movements     Progress Toward Goals: Arsh Anderson is a 22year old male s/p 3 days since admission for worsening feelings of depression and anxiety accompanied with suicidal and homicidal ideations showing signs of improvement in his depression and anxiety today denying suicidal and homicidal ideations  Recommended Treatment: Continue current medications at current doses  Encouraged to continue to attend groups and to socialize with others       Medications:   current meds:   Current Facility-Administered Medications Medication Dose Route Frequency    acetaminophen (TYLENOL) tablet 650 mg  650 mg Oral Q6H PRN    acetaminophen (TYLENOL) tablet 650 mg  650 mg Oral Q4H PRN    acetaminophen (TYLENOL) tablet 975 mg  975 mg Oral Q6H PRN    aluminum-magnesium hydroxide-simethicone (MYLANTA) 200-200-20 mg/5 mL oral suspension 30 mL  30 mL Oral Q4H PRN    benztropine (COGENTIN) injection 1 mg  1 mg Intramuscular Q6H PRN    benztropine (COGENTIN) tablet 1 mg  1 mg Oral Q6H PRN    haloperidol (HALDOL) tablet 5 mg  5 mg Oral Q8H PRN    haloperidol lactate (HALDOL) injection 5 mg  5 mg Intramuscular Q6H PRN    hydrOXYzine HCL (ATARAX) tablet 25 mg  25 mg Oral Q6H PRN    LORazepam (ATIVAN) 2 mg/mL injection 1 mg  1 mg Intramuscular Q8H PRN    LORazepam (ATIVAN) tablet 1 mg  1 mg Oral Q8H PRN    magnesium hydroxide (MILK OF MAGNESIA) 400 mg/5 mL oral suspension 30 mL  30 mL Oral Daily PRN    OLANZapine (ZyPREXA ZYDIS) dispersible tablet 10 mg  10 mg Oral Q3H PRN    OLANZapine (ZyPREXA) IM injection 10 mg  10 mg Intramuscular Q3H PRN    risperiDONE (RisperDAL M-TABS) dispersible tablet 1 mg  1 mg Oral Q8H PRN    sertraline (ZOLOFT) tablet 50 mg  50 mg Oral Daily    traZODone (DESYREL) tablet 50 mg  50 mg Oral HS PRN         Labs: None in last 24 hours

## 2019-08-26 NOTE — PROGRESS NOTES
08/26/19 0700   Team Meeting   Meeting Type Daily Rounds   Team Members Present   Team Members Present Physician;Nurse;;; Other (Discipline and Name)   Physician Team Member Lewis Luu   Nursing Team Member [id number] [facility] [employer] Management Team Member [id number] Work Team Member Anika Vuong   Other (Discipline and Name) Med student Dominguez Estrada MD student Kamar   Patient/Family Present   Patient Present No   Patient's Family Present No     Possible D/C Tuesday

## 2019-08-26 NOTE — CASE MANAGEMENT
called patients mother [employer]  for update  [employer] said she visited with Maty Chen over the weekend and feel he is ready for discharge and said "he is so so much better"  She said Maty Chen was bright ,conversing and smiling   will call her back tomorrow after team meeting to confirm discharge and [employer] said she can pick him up at 1400  Phone [phone]

## 2019-08-26 NOTE — CASE MANAGEMENT
met with patient to review the discharge plan and intake appointment  Christiano Rea was agreeable and said he will be compliant with outpatient  He said at this point he does not know the schedule for his fall classes and said he will reschedule the intake appointment if needed  He also asked for a note for school which will be provided tomorrow at discharge

## 2019-08-26 NOTE — PROGRESS NOTES
Patient denies SI/HI, auditory or visual hallucinations  Reports sleeping good and anxiety 2/10 and depression 1/10  Today is the first day for [employer], sophomore, neuroscience/cognitive thinking major  Bevely Sic (mother) TC and MARK signed thereafter with patient  Patient states an exception to MARK not to disclose content/conversations with mother  MARK can relate dx, meds, discharge planning and psychiatric evaluation  MARK placed in scanning  Mother stated over the weekend the patient has improved overall  Plans to call Ria Khanna   today  Patient interactive and social with others  Present in the milieu and cooperative  Will continue to monitor

## 2019-08-26 NOTE — PLAN OF CARE
Problem: Risk for Self Injury/Neglect  Goal: Verbalize thoughts and feelings  Description  Interventions:  - Assess and re-assess patient's lethality and potential for self-injury  - Engage patient in 1:1 interactions, daily, for a minimum of 15 minutes  - Encourage patient to express feelings, fears, frustrations, hopes  - Establish rapport/trust with patient   Outcome: Progressing  Goal: Refrain from harming self  Description  Interventions:  - Monitor patient closely, per order  - Develop a trusting relationship  - Supervise medication ingestion, monitor effects and side effects   Outcome: Progressing     Problem: Depression  Goal: Refrain from isolation  Description  Interventions:  - Develop a trusting relationship   - Encourage socialization   Outcome: Progressing     Problem: Risk for Violence/Aggression Toward Others  Goal: Treatment Goal: Refrain from acts of violence/aggression during length of stay, and demonstrate improved impulse control at the time of discharge  Outcome: Progressing  Goal: Refrain from harming others  Outcome: Progressing     Problem: Nutrition/Hydration-ADULT  Goal: Nutrient/Hydration intake appropriate for improving, restoring or maintaining nutritional needs  Description  Monitor and assess patient's nutrition/hydration status for malnutrition  Collaborate with interdisciplinary team and initiate plan and interventions as ordered  Monitor patient's weight and dietary intake as ordered or per policy  Utilize nutrition screening tool and intervene as necessary  Determine patient's food preferences and provide high-protein, high-caloric foods as appropriate       INTERVENTIONS:  - Monitor oral intake, urinary output, labs, and treatment plans  - Assess nutrition and hydration status and recommend course of action  - Evaluate amount of meals eaten  - Assist patient with eating if necessary   - Allow adequate time for meals  - Recommend/ encourage appropriate diets, oral nutritional supplements, and vitamin/mineral supplements  - Order, calculate, and assess calorie counts as needed  - Recommend, monitor, and adjust tube feedings and TPN/PPN based on assessed needs  - Assess need for intravenous fluids  - Provide specific nutrition/hydration education as appropriate  - Include patient/family/caregiver in decisions related to nutrition  Outcome: Progressing     Problem: SELF HARM/SUICIDALITY  Goal: Will have no self-injury during hospital stay  Description  INTERVENTIONS:  - Q 15 MINUTES: Routine safety checks  - Q WAKING SHIFT & PRN: Assess risk to determine if routine checks are adequate to maintain patient safety  - Encourage patient to participate actively in care by formulating a plan to combat response to suicidal ideation, identify supports and resources  Outcome: Progressing     Problem: SLEEP DISTURBANCE  Goal: Will exhibit normal sleeping pattern  Description  Interventions:  -  Assess the patients sleep pattern, noting recent changes  - Administer medication as ordered  - Decrease environmental stimuli, including noise, as appropriate during the night  - Encourage the patient to actively participate in unit groups and or exercise during the day to enhance ability to achieve adequate sleep at night  - Assess the patient, in the morning, encouraging a description of sleep experience  Outcome: Progressing

## 2019-08-26 NOTE — PROGRESS NOTES
Pt presents with bright affect, attending group and visible in dayroom frequently  Positive interactions with peers and staff  Denies SI  Shows positive insight and discusses what he learned in group: "I learned about the resources that are out there and how to express my emotions instead of bottling things up   It's weird, since I've been here I feel a lot better, probably because I know what to do next time I need help "

## 2019-08-26 NOTE — PROGRESS NOTES
Progress Note - Behavioral Health     Marlene Coburn 22 y o  male MRN: [id number]   Unit/Bed#: [facility] 340-01 Encounter: [id number]    Behavior over the last 24 hours: improving  Lucio Pina is doing better, states he feels less depressed and rates mood as 2 on a scale of 1 (best mood) to 10 (worst mood) today  His thoughts are more organized now  He still has some anxiety symptoms  Compliant with medications  Attends group therapy  Sleep: normal  Appetite: normal  Medication side effects: No   ROS: no complaints, denies any shortness of breath, chest pain or abdominal pain    Mental Status Evaluation:    Appearance:  casually dressed, hospital attire on the top of own clothes   Behavior:  pleasant, cooperative   Speech:  soft   Mood:  anxious, less depressed   Affect:  brighter   Thought Process:  organized, goal directed   Associations: intact associations   Thought Content:  no overt delusions   Perceptual Disturbances: no auditory hallucinations, no visual hallucinations   Risk Potential: Suicidal ideation - None at present  Homicidal ideation - None at present  Potential for aggression - No   Sensorium:  oriented to person, place and time/date   Memory:  recent and remote memory grossly intact   Consciousness:  alert and awake   Attention: attention span and concentration are improving   Insight:  moderate   Judgment: moderate   Gait/Station: normal gait/station, normal balance   Motor Activity: no abnormal movements     Vital signs in last 24 hours:    Temp:  [97 5 °F (36 4 °C)-97 7 °F (36 5 °C)] 97 7 °F (36 5 °C)  HR:  [71-80] 71  Resp:  [16] 16  BP: (124-131)/(66-71) 124/71    Laboratory results: I have personally reviewed all pertinent laboratory/tests results      Thyroid Studies:   Lab Results   Component Value Date    RUJ2KNHTRZPT 1 140 08/23/2019   RPR:   Lab Results   Component Value Date    RPR Non-Reactive 08/23/2019   Hemoglobin A1C/EST AVG Glucose   Lab Results   Component Value Date    HGBA1C 5 0 08/23/2019    EAG 97 08/23/2019       Progress Toward Goals: progressing, slowly achieving admission goals, still anxious, less depressed, no longer suicidal, no longer reports homicidal thoughts    Assessment/Plan   Principal Problem:    Major depressive disorder, single episode, severe without psychotic features ([facility] [address] )    Recommended Treatment:     Planned medication and treatment changes: All current active medications have been reviewed  Encourage group therapy, milieu therapy and occupational therapy  Behavioral Health checks every 7 minutes   to contact mother to assess progress and support after discharge  Discharge planned for tomorrow       Continue current medications:    Current Facility-Administered Medications:  acetaminophen 650 mg Oral Q6H PRN Rachelle Jain MD   acetaminophen 650 mg Oral Q4H PRN Rachelle Jain MD   acetaminophen 975 mg Oral Q6H PRN Rachelle Jain MD   aluminum-magnesium hydroxide-simethicone 30 mL Oral Q4H PRN Rachelle Jain MD   benztropine 1 mg Intramuscular Q6H PRN Carlos Penn MD   benztropine 1 mg Oral Q6H PRN Carlos Penn, MD   haloperidol 5 mg Oral Q8H PRN Rachelle Jain MD   haloperidol lactate 5 mg Intramuscular Q6H PRN Carlos Gums, MD   hydrOXYzine HCL 25 mg Oral Q6H PRN Rachelle Jain MD   LORazepam 1 mg Intramuscular Q8H PRN Rachelle Jain MD   LORazepam 1 mg Oral Q8H PRN Rachelle Jain MD   magnesium hydroxide 30 mL Oral Daily PRN Rachelle Jain MD   OLANZapine 10 mg Oral Q3H PRN Carlos Penn, MD   OLANZapine 10 mg Intramuscular Q3H PRN Carlos Penn MD   risperiDONE 1 mg Oral Q8H PRN Rachelle Jain MD   sertraline 50 mg Oral Daily Rawleigh GumMD osman   traZODone 50 mg Oral HS PRN Rachelle Jain MD       Risks / Benefits of Treatment:    Risks, benefits, and possible side effects of medications explained to patient and patient verbalizes understanding and agreement for treatment  Counseling / Coordination of Care:    Medication changes reviewed with staff in treatment team meeting  Medications, treatment progress and treatment plan reviewed with patient  Discharge plan discussed with patient      Marco Pike MD 08/26/19

## 2019-08-27 VITALS
RESPIRATION RATE: 16 BRPM | BODY MASS INDEX: 19.98 KG/M2 | HEART RATE: 107 BPM | WEIGHT: 139.6 LBS | OXYGEN SATURATION: 97 % | DIASTOLIC BLOOD PRESSURE: 72 MMHG | SYSTOLIC BLOOD PRESSURE: 155 MMHG | TEMPERATURE: 98.4 F | HEIGHT: 70 IN

## 2019-08-27 PROCEDURE — 99239 HOSP IP/OBS DSCHRG MGMT >30: CPT | Performed by: PSYCHIATRY & NEUROLOGY

## 2019-08-27 RX ADMIN — SERTRALINE HYDROCHLORIDE 50 MG: 50 TABLET ORAL at 08:12

## 2019-08-27 NOTE — PROGRESS NOTES
08/27/19 0700   Team Meeting   Meeting Type Daily Rounds   Team Members Present   Team Members Present Physician;Nurse;;; Other (Discipline and Name)   Physician Team Member [address] Team [facility]   Care Management Team Member [id number] Work Team Member Jaydon   Other (Discipline and Name) MD student Kamar   Patient/Family Present   Patient Present No   Patient's Family Present No   Mom will pick patient up for discharge at 2 p m

## 2019-08-27 NOTE — DISCHARGE INSTR - OTHER ORDERS
[address] is a toll-free telephone number for people in [facility] who are seeking a listening ear for additional support in their recovery from mental illness  The PEER LINE is peer-run and peer-friendly  Callers to the [address] will speak directly to other individuals who have experienced the mental health recovery process  The PEER LINE staff possess these three core values to assist and support the PEER LINE caller:  Acceptance   [address]  Promote individual recovery and strengthen communities  Funding Information  This project is funded, in part, under contract with [facility], through funds provided by the [employer]  [facility]overy [facility] has always promoted, used, and remains faithful to procedure and language that reflects recovery-based and person-first principles  [facility]'s [employer]   You can call the Peer Line 24 hours a day  Phone: 5-629-UD-PEERS  (7-589.196.2407)         [facility] Crisis Phone Number : [address] [place] Crisis Phone Number : [phone]    National Suicide Hotline : 1 571.515.4535    Crisis Text Line : 984139      The [facility] Family-to-Family Education Program is a free 12-week (2 1/2 hours/week) course for families of individuals with severe brain disorders (mental illnesses)  The classes are taught by trained family members  All course materials are furnished at no cost to you  Below are some details  To register, e-mail [email or web address] or call [phone]  The curriculum focuses on schizophrenia, bipolar disorder (manic depression), clinical depression, panic disorders and obsessive-compulsive disorder (OCD)   The course discusses the clinical treatment of these illnesses and teaches the knowledge and skills that family members need to cope more effectively  Topics Include:  Learning about feelings, learning about facts   Schizophrenia, major depression and fouzia: diagnosis and dealing with critical periods   Subtypes of depression and bipolar disorder, panic disorder and OCD; diagnosis and causes; sharing our stories   The biology of the brain/new research   Problem solving workshops   Medication review   Empathy workshop  what its like to have a brain disorder   Communication skills workshop   Self-care and relative groups   Rehabilitation, services available   Advocacy: fighting stigma   Review and certification ceremony    Xkor-xn-Cubg Education Course  The Digital Perception Education class is a ten week  two hours per week  experiential education course on the topic of recovery for any person with serious mental illness who is interested in establishing and maintaining wellness  The course uses a combination of lecture, interactive exercises, and structural group processes  The diversity of experience among participants affords for a lively dynamic that moves the course along  [facility]s Hkmo-ek-Dcqn Education class is offered free of charge to people who experience mental illness  You do not need to be a member of [facility] to take the course  Courses are taught by teams of trained mentors/peer-teachers who are themselves experienced at living well with mental illness  Below are some details  To register, call [phone] or e-mail [email or web address]  Sign up today! [facility] group is for family members, caregivers, and loved ones of individuals living with the everyday challenges of mental illness  The leaders are family members in the same situation  Sessions take place in an intimate, confidential setting to allow families to share openly with each other    These support groups allow participants to learn from the experiences of other group members, share coping strategies, and offer each other encouragement and understanding  Aria Garibay know that you are not alone  Drop inno registration is necessary  Here are the times and locations  RADHA  Monthly: 3rd Monday, 7:00-8:30 pm  [address], [place]  Monthly: 4th Tuesday, 7:00-8:30 pm  [address] [facility]  Monthly: 1st Monday, 7:00-8:30 pm  [facility]  [address]         Monthly Support for Persons with Mental Illness  The Peer Support Group is a monthly meeting for individuals facing the challenges of recovering from severe and persistent mental illness  Depression, manic depression, schizophrenia, and general anxiety disorder are only a few of the diagnoses of individuals who have found a supportive place at our meetings  Our [place]  We are a fellowship of individuals who share a common goal of recovery and the ability to maintain mental and emotional stability  We help others and ourselves through sharing our experiences, strength and hope with each other  No matter how traumatic our past or how despairing our present may be, there is hope for a new day  Sessions take place in an intimate, confidential setting to allow individuals to share openly with each other  Aria Garibay know that you are not alone  Drop inno registration is necessary  Here are the times and locations    WALI  Monthly: 1st Monday, 7:00-8:30 pm  [facility]  [address] [place], [place]   [email or web address]  Monthly: 3rd Monday, 7:00-8:30 pm  [address]  [address] [facility]

## 2019-08-27 NOTE — NURSING NOTE
Patient discharged to care of mother with both verbal and written discharge instructions provided  All belongings returned and prescriptions also provided

## 2019-08-27 NOTE — QUICK NOTE
Progress Note - Behavioral Health   Mak Cummings 22 y o  male MRN: [id number]  Unit/Bed#: [facility] 340-01 Encounter: [id number]    Assessment/Plan   Principal Problem:    Major depressive disorder, single episode, severe without psychotic features ([address] )      Behavior over the last 24 hours:  Improved    Ade Norman reports improvement in his depression and anxiety today rating them at a 1/10 and 1-2/10 respectively  The patient reports attaining 6-7 hours of sleep last night feeling rested today  His appetite has been normal  The patient denies hallucinations, suicidal ideations, homicidal ideations, and thoughts of self harm  Ade Norman has not noticed any side effects from the medications and has been attending group sessions and socializing with others  Sleep: normal  Appetite: normal  Medication side effects: No  ROS: patient denies chest pain, SOB, constipation, diarrhea and rash    Mental Status Evaluation:  Appearance:  casually dressed   Behavior:  normal   Speech:  normal pitch and normal volume   Mood:  Less depressed and less anxious   Affect:  normal   Thought Process:  normal   Thought Content:  normal   Perceptual Disturbances: None   Risk Potential: Suicidal Ideations none, Homicidal Ideations none and Potential for Aggression No   Sensorium:  Oriented to person, place and time/date   Cognition:  grossly intact   Consciousness:  alert and awake    Attention: attention span and concentration were age appropriate   Insight:  good   Judgment: good   Gait/Station: normal gait/station   Motor Activity: no abnormal movements     Progress Toward Goals: Ade Norman is a 22year old male s/p 4 days since admission for worsening feelings of depression and anxiety accompanied with suicidal and homicidal ideations showing signs of improvement in his depression and anxiety today denying suicidal and homicidal ideations        Recommended Treatment: Continue current medications at current doses   Encouraged to continue with group sessions and to socialize with others  Medications:   current meds:   Current Facility-Administered Medications   Medication Dose Route Frequency    acetaminophen (TYLENOL) tablet 650 mg  650 mg Oral Q6H PRN    acetaminophen (TYLENOL) tablet 650 mg  650 mg Oral Q4H PRN    acetaminophen (TYLENOL) tablet 975 mg  975 mg Oral Q6H PRN    aluminum-magnesium hydroxide-simethicone (MYLANTA) 200-200-20 mg/5 mL oral suspension 30 mL  30 mL Oral Q4H PRN    benztropine (COGENTIN) injection 1 mg  1 mg Intramuscular Q6H PRN    benztropine (COGENTIN) tablet 1 mg  1 mg Oral Q6H PRN    haloperidol (HALDOL) tablet 5 mg  5 mg Oral Q8H PRN    haloperidol lactate (HALDOL) injection 5 mg  5 mg Intramuscular Q6H PRN    hydrOXYzine HCL (ATARAX) tablet 25 mg  25 mg Oral Q6H PRN    LORazepam (ATIVAN) 2 mg/mL injection 1 mg  1 mg Intramuscular Q8H PRN    LORazepam (ATIVAN) tablet 1 mg  1 mg Oral Q8H PRN    magnesium hydroxide (MILK OF MAGNESIA) 400 mg/5 mL oral suspension 30 mL  30 mL Oral Daily PRN    OLANZapine (ZyPREXA ZYDIS) dispersible tablet 10 mg  10 mg Oral Q3H PRN    OLANZapine (ZyPREXA) IM injection 10 mg  10 mg Intramuscular Q3H PRN    risperiDONE (RisperDAL M-TABS) dispersible tablet 1 mg  1 mg Oral Q8H PRN    sertraline (ZOLOFT) tablet 50 mg  50 mg Oral Daily    traZODone (DESYREL) tablet 50 mg  50 mg Oral HS PRN         Labs: No new labs in the last 24 hours

## 2019-08-27 NOTE — DISCHARGE INSTRUCTIONS
Depression   WHAT YOU NEED TO KNOW:   Depression is a medical condition that causes feelings of sadness or hopelessness that do not go away  Depression may cause you to lose interest in things you used to enjoy  These feelings may interfere with your daily life  DISCHARGE INSTRUCTIONS:   Call 911 for any of the following:   · You think about harming yourself or someone else  Contact your healthcare provider if:   · Your symptoms do not improve  · You cannot make it to your next appointment  · You have new symptoms  · You have questions or concerns about your condition or care  Medicines:   · Antidepressants  may be given to improve or balance your mood  You may need to take this medicine for several weeks before you begin to feel better  · Take your medicine as directed  Contact your healthcare provider if you think your medicine is not helping or if you have side effects  Tell him of her if you are allergic to any medicine  Keep a list of the medicines, vitamins, and herbs you take  Include the amounts, and when and why you take them  Bring the list or the pill bottles to follow-up visits  Carry your medicine list with you in case of an emergency  Therapy  may be used to treat your depression  A therapist will help you learn to cope with your thoughts and feelings  This can be done alone or in a group  It may also be done with family members or a significant other  Self-care:   · Get regular physical activity  Try to exercise for 30 minutes, 3 to 5 days a week  Work with your healthcare provider to develop an exercise plan that you enjoy  Physical activity may improve your symptoms  · Get enough sleep  Create a routine to help you relax before bed  You can listen to music, read, or do yoga  Try to go to bed and wake up at the same time every day  Sleep is important for emotional health  · Eat a variety of healthy foods from all of the food groups    A healthy meal plan is low in fat, salt, and added sugar  Ask your healthcare provider for more information about a meal plan that is right for you  · Do not drink alcohol or use drugs  Alcohol and drugs can make your symptoms worse  Follow up with your healthcare provider as directed: Your healthcare provider will monitor your progress at follow-up visits  He or she will also monitor your medicine if you take antidepressants  Your healthcare provider will ask if the medicine is helping  Tell him or her about any side effects or problems you may have with your medicine  The type or amount of medicine may need to be changed  Write down your questions so you remember to ask them during your visits  © 2017 [employer] Information is for End User's use only and may not be sold, redistributed or otherwise used for commercial purposes  All illustrations and images included in CareNotes® are the copyrighted property of [employer]  or [employer]  The above information is an  only  It is not intended as medical advice for individual conditions or treatments  Talk to your doctor, nurse or pharmacist before following any medical regimen to see if it is safe and effective for you  Sertraline (By mouth)   Sertraline (SER-tra-brandee)  Treats depression, obsessive-compulsive disorder (OCD), posttraumatic stress disorder (PTSD), premenstrual dysphoric disorder (PMDD), social anxiety disorder, and panic disorder  This medicine is an SSRI  Brand Name(s): Zoloft   There may be other brand names for this medicine  When This Medicine Should Not Be Used: This medicine is not right for everyone  Do not use it if you had an allergic reaction to sertraline  How to Use This Medicine:   Liquid, Tablet  · Take your medicine as directed  Your dose may need to be changed several times to find what works best for you  You may need to take it for a few weeks or months before you feel better    · Oral liquid: Use the dropper provided to remove the medicine and mix it with 1/2 cup (4 ounces) of water, ginger ale, lemon-lime soda, lemonade, or orange juice  Drink the mixture right away  It is normal for it to look a bit hazy  · This medicine should come with a Medication Guide  Ask your pharmacist for a copy if you do not have one  · Missed dose: Take a dose as soon as you remember  If it is almost time for your next dose, wait until then and take a regular dose  Do not take extra medicine to make up for a missed dose  · Store the medicine in a closed container at room temperature, away from heat, moisture, and direct light  Drugs and Foods to Avoid:   Ask your doctor or pharmacist before using any other medicine, including over-the-counter medicines, vitamins, and herbal products  · Do not use this medicine together with pimozide  Do not use this medicine and an MAO inhibitor (MAOI) within 14 days of each other  Do not use the oral liquid form of sertraline if you are also using disulfiram   · Some medicines can affect how sertraline works  Tell your doctor if you are using the following:   ¨ Buspirone, cimetidine, cisapride, diazepam, digitoxin, fentanyl, flecainide, lithium, phenytoin, propafenone, Yelena's wort, tramadol, tryptophan supplements, or valproate  ¨ A blood thinner (such as warfarin), a diuretic (water pill), an NSAID pain or arthritis medicine (such as aspirin, diclofenac, ibuprofen), a tricyclic antidepressant, a triptan medicine for migraine headaches  · Do not drink alcohol while you are using this medicine  Warnings While Using This Medicine:   · Tell your doctor if you are pregnant or breastfeeding, or if you have liver disease, bleeding problems, glaucoma, heart disease, or a seizure disorder  · For some children, teenagers, and young adults, this medicine may increase mental or emotional problems  This may lead to thoughts of suicide and violence   Talk with your doctor right away if you have any thoughts or behavior changes that concern you  Tell your doctor if you or anyone in your family has a history of bipolar disorder or suicide attempts  · This medicine may cause the following problems:   ¨ Serotonin syndrome (when taken with certain medicines)  ¨ Low sodium levels (more common in elderly patients and those who take diuretics or become dehydrated)  · Tell your doctor if you are sensitive to latex, because the oral liquid comes with a latex rubber dropper  · This medicine may make you dizzy or drowsy  Do not drive or do anything that could be dangerous until you know how this medicine affects you  · Do not stop using this medicine suddenly  Your doctor will need to slowly decrease your dose before you stop it completely  · Your doctor will check your progress and the effects of this medicine at regular visits  Keep all appointments  · Keep all medicine out of the reach of children  Never share your medicine with anyone    Possible Side Effects While Using This Medicine:   Call your doctor right away if you notice any of these side effects:  · Allergic reaction: Itching or hives, swelling in your face or hands, swelling or tingling in your mouth or throat, chest tightness, trouble breathing  · Anxiety, restlessness, fast heartbeat, fever, sweating, muscle spasms, twitching, nausea, vomiting, diarrhea, seeing or hearing things that are not there  · Blistering, peeling, or red skin rash  · Confusion, weakness, and muscle twitching  · Eye pain, vision changes, seeing halos around lights  · Feeling more excited or energetic than usual  · Thoughts of hurting yourself or others, unusual behavior  · Unusual bleeding or bruising  If you notice these less serious side effects, talk with your doctor:   · Dry mouth  · Loss of appetite, weight loss  · Mild diarrhea, constipation, nausea, vomiting  · Sexual problems  · Sleepiness, or trouble sleeping  If you notice other side effects that you think are caused by this medicine, tell your doctor  Call your doctor for medical advice about side effects  You may report side effects to FDA at 6-245-FDA-5333  © 2017 [employer]  Information is for End User's use only and may not be sold, redistributed or otherwise used for commercial purposes  The above information is an  only  It is not intended as medical advice for individual conditions or treatments  Talk to your doctor, nurse or pharmacist before following any medical regimen to see if it is safe and effective for you  Suicide Prevention for Adults   WHAT YOU NEED TO KNOW:   A person may see suicide as the only way to escape emotional or physical pain and suffering  You can help provide emotional support for him or her and get the help he or she needs  Learn to recognize warning signs that the person may be considering suicide  Find resources to help prevent him or her from attempting to take his or her life  DISCHARGE INSTRUCTIONS:   Call 911 if:   · The person has done something on purpose to hurt himself or herself  · The person tries to commit suicide  Seek care immediately if:   · The person tells you he or she made a plan to commit suicide  · The person acts out in anger, is reckless, or is abusing alcohol or drugs  · The person has serious thoughts of suicide, even with treatment  Contact the person's healthcare provider or therapist if:   · You begin to see warning signs that the person may be considering suicide  · The person has intense feelings of sadness, anger, revenge, or despair, or he cannot make decisions easily  · The person tells you he or she has more thoughts of suicide when they are alone  · The person withdraws from others  · The person stops eating, or begins to smoke or drink heavily  · The person feels he or she is a burden because of a disability or disease      · The person has trouble dealing with stress, such as a breakup or a job loss  · You have questions or concerns about the person's condition or care  What to do if the person is having thoughts of suicide:  Call 911 if you feel the person is at immediate risk of suicide, or if he or she talks about an active suicide plan  Assume that the person intends to carry out his or her plan  Resources are available to help you and the person  The following are some things you can do:  · Call the [address] [place] [address] at [phone] ([phone])   · Call the Suicide Hotline at 2-814-FFQRNOK (3-276.737.7405)   · Contact the person's therapist  His or her healthcare provider can give you a list of therapists if he or she does not have one  · Keep medicines, weapons, and alcohol out of the person's reach  Do not leave the person alone if he or she says they want to commit suicide  Do not leave the person alone if you think he or she may try it  Make sure you do not put yourself at risk if the person has a weapon  · Do not be afraid to ask if the person is thinking of ending his or her life  Ask if the person has a plan for hurting or killing himself or herself    Warning signs to watch for:   · Talking about a plan for committing suicide, or suddenly deciding to make a will    · Cutting himself or herself, burning the skin with cigarettes, or driving recklessly    · Drug or alcohol use, not taking prescribed medicine, or taking too much prescribed medicine    · Sudden anger, lashing out at others, or seeming hopeless, anxious, or angry and then suddenly becoming happy or peaceful    · Not wanting to spend time with others or doing things he or she usually enjoys    · Trouble at work, or not showing up for work    · A change in the way he or she eats, sleeps, or dresses    · Weight gain or loss or having less energy than usual    · Trouble sleeping or spending a lot of time sleeping    · Giving away or throwing away his or her belongings  Treatments the person may need: · Medicines  may be given to prevent mood swings, or to decrease anxiety or depression  The person will need to take all medicines as directed  A sudden stop can be harmful  It may take 4 to 6 weeks for the medicine to help him or her feel better  · A therapist  can help the person identify and change negative feelings or beliefs about himself or herself  This may also help change the way the he or she feels and acts  A therapist can also help the person find ways to cope with things that cannot be changed  Follow up with the person's healthcare provider and therapist as directed:  Write down your questions so you remember to ask them during your visits  What you can do to help the person:   · Encourage the person to seek help for drug or alcohol abuse  Drugs and alcohol can increase suicidal thoughts and make the person more likely to act on them  · Help the person connect with others  Encourage him or her to become involved in the community  Some examples include tutoring a young student, volunteering at a [employer], or joining a group exercise program  The person may need help setting up a computer or creating an e-mail account to help him or her remain connected to others  · Exercise with the person  Exercise can lift his or her mood, increase energy, and make it easier to sleep at night  · Encourage the person to try new things  Adults who are open to new experiences handle stress and change better than those who are not  · Call, visit, or send postcards to the person often  Check on him or her after the loss of a pet, longtime friend, or child  Holidays, birthdays, and anniversaries can be difficult for a person after a loss  The loss of a spouse can be especially painful and lonely  · Help the person schedule a visit with his or her Sikhism or spiritual leader  A Sikhism or spiritual leader may be able to offer additional support and resources to the person  · Help the person get equipment that will increase his or her comfort and mobility  Examples are hearing aids, glasses, large print books, and walkers  These can help him or her enjoy activities and feel more independent  · Encourage the person to continue taking medicine and going to therapy  Medicine and therapy can help improve his or her mental health  For support and more information:   · [facility] , [address] [place] [address]  Phone: 2- 912 - 840-FBEH (01 33 43 04 02)  Web Address: [employer]  · Suicide Awareness [employer]  [address] [place] [address] [address] , [address]  Phone: 5- 051 - 714-4814  Web Address: [employer]  org  © 2017 [employer] Information is for End User's use only and may not be sold, redistributed or otherwise used for commercial purposes  All illustrations and images included in CareNotes® are the copyrighted property of [employer] , [employer]  or [employer]  The above information is an  only  It is not intended as medical advice for individual conditions or treatments  Talk to your doctor, nurse or pharmacist before following any medical regimen to see if it is safe and effective for you

## 2019-08-27 NOTE — PROGRESS NOTES
Patient denies SI/HI and denies any A/V hallucinations at this time  Patient stated that he has received help while here and is thankful for our help  Patient also stated that before he came in he was going down the wrong path and patient stated he realizes that now

## 2019-08-27 NOTE — BH TRANSITION RECORD
Contact Information: If you have any questions, concerns, pended studies, tests and/or procedures, or emergencies regarding your inpatient behavioral health visit  Please contact [address] [place] [address] behavioral health unit [facility] [phone]  and ask to speak to a , nurse or physician  A contact is available 24 hours/ 7 days a week at this number  Summary of Procedures Performed During your Stay:  Below is a list of major procedures performed during your hospital stay and a summary of results:  - Cardiac Procedures/Studies: EKG  Pending Studies (From admission, onward)    None        If studies are pending at discharge, follow up with your PCP and/or referring provider

## 2019-10-04 ENCOUNTER — TELEPHONE (OUTPATIENT)
Dept: PEDIATRICS CLINIC | Facility: MEDICAL CENTER | Age: 19
End: 2019-10-04

## 2019-10-04 NOTE — TELEPHONE ENCOUNTER
Mom calling  Was here yesterday with sibling  phoebe    Mom calling today to verify that event that took place with ronen back in 8/21/2019 with health call is indeed in [facility] chart  Sibling phoebe had a visit yesterday and mom inquired with dr Merced Garcia if this event was recorded in [facility] chart  Dr Merced Garcia said it was not  Reviewing the chart the information regarding moms call to our office 8/21/2019 regarding Robert Levine is  indeed in [facility] chart  Mom grateful for the call today  Feels much better

## 2020-07-30 ENCOUNTER — OFFICE VISIT (OUTPATIENT)
Dept: PEDIATRICS CLINIC | Facility: MEDICAL CENTER | Age: 20
End: 2020-07-30
Payer: COMMERCIAL

## 2020-07-30 VITALS
TEMPERATURE: 97.9 F | WEIGHT: 144.6 LBS | HEART RATE: 87 BPM | HEIGHT: 69 IN | DIASTOLIC BLOOD PRESSURE: 76 MMHG | RESPIRATION RATE: 18 BRPM | SYSTOLIC BLOOD PRESSURE: 112 MMHG | BODY MASS INDEX: 21.42 KG/M2

## 2020-07-30 DIAGNOSIS — Z13.31 SCREENING FOR DEPRESSION: ICD-10-CM

## 2020-07-30 DIAGNOSIS — Z00.00 ANNUAL PHYSICAL EXAM: Primary | ICD-10-CM

## 2020-07-30 PROCEDURE — 3008F BODY MASS INDEX DOCD: CPT | Performed by: PEDIATRICS

## 2020-07-30 PROCEDURE — 96127 BRIEF EMOTIONAL/BEHAV ASSMT: CPT | Performed by: PEDIATRICS

## 2020-07-30 PROCEDURE — 99395 PREV VISIT EST AGE 18-39: CPT | Performed by: PEDIATRICS

## 2020-07-30 NOTE — PATIENT INSTRUCTIONS

## 2020-07-30 NOTE — PROGRESS NOTES
[facility]ED[facility]    NAME: Joel Williamson  AGE: 20 y o  SEX: male  : 2000     DATE: 2020     Assessment and Plan:     Problem List Items Addressed This Visit     None      Visit Diagnoses     Annual physical exam    -  Primary    Screening for depression              Immunizations and preventive care screenings were discussed with patient today  Appropriate education was printed on patient's after visit summary  Counseling:  Alcohol/drug use: discussed moderation in alcohol intake, the recommendations for healthy alcohol use, and avoidance of illicit drug use  Dental Health: discussed importance of regular tooth brushing, flossing, and dental visits  Injury prevention: discussed safety/seat belts, safety helmets, smoke detectors, carbon dioxide detectors, and smoking near bedding or upholstery  Sexual health: discussed sexually transmitted diseases, partner selection, use of condoms, avoidance of unintended pregnancy, and contraceptive alternatives  · Exercise: the importance of regular exercise/physical activity was discussed  Recommend exercise 3-5 times per week for at least 30 minutes  Depression Screening and Follow-up Plan: Patient's depression screening was positive with a PHQ-2 score of 0  Their PHQ-9 score was 1  Continue regular follow-up with their mental health provider who is managing their mental health condition(s)  Patient has been followed by psychiatrist   Patient states that he is stable  Patient is still taking the Zoloft    No follow-ups on file  Chief Complaint:     Chief Complaint   Patient presents with    Well Child     20 yr well      History of Present Illness:     Adult Annual Physical   Patient here for a comprehensive physical exam  The patient reports no problems  Diet and Physical Activity  · Diet/Nutrition: well balanced diet  · Exercise: 5-7 times a week on average  Depression Screening  PHQ-9 Depression Screening    PHQ-9:    Frequency of the following problems over the past two weeks:       Little interest or pleasure in doing things:  0 - not at all  Feeling down, depressed, or hopeless:  0 - not at all  Trouble falling or staying asleep, or sleeping too much:  0 - not at all  Feeling tired or having little energy:  0 - not at all  Poor appetite or overeatin - several days  Feeling bad about yourself - or that you are a failure or have let yourself or your family down:  0 - not at all  Trouble concentrating on things, such as reading the newspaper or watching television:  0 - not at all  Moving or speaking so slowly that other people could have noticed  Or the opposite - being so fidgety or restless that you have been moving around a lot more than usual:  0 - not at all  Thoughts that you would be better off dead, or of hurting yourself in some way:  0 - not at all  PHQ-2 Score:  0  PHQ-9 Score:  1       General Health  · Sleep: sleeps well  · Hearing: none reported  · Vision: wears contacts  · Dental: regular dental visits   Health  · History of STDs?: no      Review of Systems:     Review of Systems   Constitutional: Negative for activity change and fever  HENT: Negative for ear discharge, ear pain and sore throat  Eyes: Negative for discharge and visual disturbance  Respiratory: Negative for chest tightness and wheezing  Cardiovascular: Negative for chest pain  Gastrointestinal: Negative for abdominal distention, diarrhea and vomiting  Musculoskeletal: Negative for arthralgias  Skin: Negative for rash  Allergic/Immunologic: Negative for food allergies  Neurological: Negative for dizziness and seizures  Hematological: Negative for adenopathy  Does not bruise/bleed easily  Psychiatric/Behavioral: Negative for agitation        Past Medical History:     Past Medical History:   Diagnosis Date    Pneumonia     Sleep difficulties Past Surgical History:     Past Surgical History:   Procedure Laterality Date    CIRCUMCISION      WISDOM TOOTH EXTRACTION        Social History:        Social History     Socioeconomic History    Marital status: Single     Spouse name: None    Number of children: None    Years of education: None    Highest education level: None   Occupational History    None   Social Needs    Financial resource strain: None    Food insecurity:     Worry: None     Inability: None    Transportation needs:     Medical: None     Non-medical: None   Tobacco Use    Smoking status: Never Smoker    Smokeless tobacco: Never Used    Tobacco comment: no tobacco/smoke exposure   Substance and Sexual Activity    Alcohol use: Yes     Frequency: Monthly or less     Drinks per session: 1 or 2     Binge frequency: Never    Drug use: No    Sexual activity: Not Currently   Lifestyle    Physical activity:     Days per week: None     Minutes per session: None    Stress: None   Relationships    Social connections:     Talks on phone: None     Gets together: None     Attends Christianity service: None     Active member of club or organization: None     Attends meetings of clubs or organizations: None     Relationship status: None    Intimate partner violence:     Fear of current or ex partner: None     Emotionally abused: None     Physically abused: None     Forced sexual activity: None   Other Topics Concern    None   Social History Narrative    Brother    Household: younger brother    Lives with parents      Family History:     Family History   Problem Relation Age of Onset    No Known Problems Mother     Asthma Father     Eczema Father     Asthma Family     Eczema Family       Current Medications:     Current Outpatient Medications   Medication Sig Dispense Refill    sertraline (ZOLOFT) 50 mg tablet Take 1 tablet (50 mg total) by mouth daily for 60 days 30 tablet 1     No current facility-administered medications for this visit  Allergies:     No Known Allergies   Physical Exam:     /76 (BP Location: Right arm)   Pulse 87   Temp 97 9 °F (36 6 °C)   Resp 18   Ht 5' 8 9" (1 75 m)   Wt 65 6 kg (144 lb 9 6 oz)   BMI 21 42 kg/m²     Physical Exam   Constitutional: He is oriented to person, place, and time  He appears well-developed and well-nourished  No distress  HENT:   Head: Normocephalic and atraumatic  Right Ear: External ear normal    Left Ear: External ear normal    Nose: Nose normal    Mouth/Throat: Oropharynx is clear and moist  No oropharyngeal exudate  Eyes: Pupils are equal, round, and reactive to light  Conjunctivae and EOM are normal  Right eye exhibits no discharge  Left eye exhibits no discharge  No scleral icterus  Neck: Normal range of motion  Neck supple  No thyromegaly present  Cardiovascular: Normal rate, regular rhythm, normal heart sounds and intact distal pulses  No murmur ( no murmurs heard ) heard  Pulmonary/Chest: Effort normal and breath sounds normal  No respiratory distress  He exhibits no tenderness  Abdominal: Soft  Bowel sounds are normal  He exhibits no distension  There is no tenderness  No Hepatosplenomegaly felt   Genitourinary: Penis normal    Genitourinary Comments: Bilateral descended testicles: Yes  No inguinal hernia noted   Musculoskeletal: Normal range of motion  He exhibits no edema, tenderness or deformity  Muscle tone seems normal   No deficits noted  No joint swelling noted  Scoliosis noted: no   Lymphadenopathy:     He has no cervical adenopathy  Neurological: He is alert and oriented to person, place, and time  No cranial nerve deficit  He exhibits normal muscle tone  No neurological abnormality noted   Skin: Skin is warm  Capillary refill takes less than 2 seconds  No rash noted  He is not diaphoretic  No erythema  No pallor  Psychiatric: He has a normal mood and affect   His behavior is normal  Judgment and thought content normal        [facility] Patrick Meek MD   [facility]IATR[facility]

## 2020-12-02 ENCOUNTER — TELEPHONE (OUTPATIENT)
Dept: PEDIATRICS CLINIC | Facility: MEDICAL CENTER | Age: 20
End: 2020-12-02

## 2020-12-23 ENCOUNTER — OFFICE VISIT (OUTPATIENT)
Dept: FAMILY MEDICINE CLINIC | Facility: CLINIC | Age: 20
End: 2020-12-23
Payer: COMMERCIAL

## 2020-12-23 VITALS
DIASTOLIC BLOOD PRESSURE: 70 MMHG | RESPIRATION RATE: 14 BRPM | HEART RATE: 104 BPM | TEMPERATURE: 98.8 F | OXYGEN SATURATION: 98 % | HEIGHT: 69 IN | BODY MASS INDEX: 21.92 KG/M2 | WEIGHT: 148 LBS | SYSTOLIC BLOOD PRESSURE: 118 MMHG

## 2020-12-23 DIAGNOSIS — F41.9 ANXIETY: ICD-10-CM

## 2020-12-23 DIAGNOSIS — M79.675 PAIN IN TOES OF BOTH FEET: ICD-10-CM

## 2020-12-23 DIAGNOSIS — F32.5 MAJOR DEPRESSION IN REMISSION (HCC): Primary | ICD-10-CM

## 2020-12-23 DIAGNOSIS — Z23 NEED FOR TDAP VACCINATION: ICD-10-CM

## 2020-12-23 DIAGNOSIS — Z11.3 SCREENING FOR STD (SEXUALLY TRANSMITTED DISEASE): ICD-10-CM

## 2020-12-23 DIAGNOSIS — M79.674 PAIN IN TOES OF BOTH FEET: ICD-10-CM

## 2020-12-23 PROCEDURE — 3008F BODY MASS INDEX DOCD: CPT | Performed by: FAMILY MEDICINE

## 2020-12-23 PROCEDURE — 1036F TOBACCO NON-USER: CPT | Performed by: FAMILY MEDICINE

## 2020-12-23 PROCEDURE — 90471 IMMUNIZATION ADMIN: CPT

## 2020-12-23 PROCEDURE — 99214 OFFICE O/P EST MOD 30 MIN: CPT | Performed by: FAMILY MEDICINE

## 2020-12-23 PROCEDURE — 90715 TDAP VACCINE 7 YRS/> IM: CPT

## 2020-12-23 PROCEDURE — 3725F SCREEN DEPRESSION PERFORMED: CPT | Performed by: FAMILY MEDICINE

## 2020-12-23 RX ORDER — SERTRALINE HYDROCHLORIDE 25 MG/1
TABLET, FILM COATED ORAL
Qty: 30 TABLET | Refills: 5 | Status: SHIPPED | OUTPATIENT
Start: 2020-12-23 | End: 2021-08-17 | Stop reason: ALTCHOICE

## 2020-12-23 RX ORDER — SERTRALINE HYDROCHLORIDE 25 MG/1
TABLET, FILM COATED ORAL
COMMUNITY
Start: 2020-11-23 | End: 2020-12-23 | Stop reason: SDUPTHER

## 2021-04-15 ENCOUNTER — OFFICE VISIT (OUTPATIENT)
Dept: FAMILY MEDICINE CLINIC | Facility: CLINIC | Age: 21
End: 2021-04-15
Payer: COMMERCIAL

## 2021-04-15 VITALS
HEIGHT: 69 IN | OXYGEN SATURATION: 98 % | WEIGHT: 144.6 LBS | BODY MASS INDEX: 21.42 KG/M2 | HEART RATE: 103 BPM | TEMPERATURE: 99.4 F | RESPIRATION RATE: 18 BRPM

## 2021-04-15 DIAGNOSIS — F41.9 ANXIETY: ICD-10-CM

## 2021-04-15 DIAGNOSIS — R63.0 DECREASED APPETITE: ICD-10-CM

## 2021-04-15 DIAGNOSIS — R11.0 NAUSEA: Primary | ICD-10-CM

## 2021-04-15 DIAGNOSIS — F32.2 MAJOR DEPRESSIVE DISORDER, SINGLE EPISODE, SEVERE WITHOUT PSYCHOTIC FEATURES (HCC): Chronic | ICD-10-CM

## 2021-04-15 PROCEDURE — 99214 OFFICE O/P EST MOD 30 MIN: CPT | Performed by: NURSE PRACTITIONER

## 2021-04-15 PROCEDURE — 1036F TOBACCO NON-USER: CPT | Performed by: NURSE PRACTITIONER

## 2021-04-15 PROCEDURE — 3008F BODY MASS INDEX DOCD: CPT | Performed by: NURSE PRACTITIONER

## 2021-04-15 RX ORDER — ONDANSETRON 4 MG/1
4 TABLET, FILM COATED ORAL EVERY 8 HOURS PRN
Qty: 20 TABLET | Refills: 1 | Status: SHIPPED | OUTPATIENT
Start: 2021-04-15

## 2021-04-15 RX ORDER — HYDROXYZINE HYDROCHLORIDE 25 MG/1
25 TABLET, FILM COATED ORAL 2 TIMES DAILY PRN
COMMUNITY
Start: 2021-03-22

## 2021-04-15 NOTE — ASSESSMENT & PLAN NOTE
Worsening  Zoloft increased to 50 mg the past few weeks via his Psychiatrist   He does have Atarax for prn use and I encouraged him to use this when needed  Follow up with Psychiatrist and therapist   Denies SI/HI  ED precautions reviewed

## 2021-04-15 NOTE — PROGRESS NOTES
Assessment/Plan:    Nausea and decreased appetite- likely secondary to worsening anxiety  No red flags on exam today  He will follow up with his Psychiatrist and therapist regarding anxiety  For the nausea, he may take Zofran prn  Follow a bland diet, stay hydrated  If nausea persists, consider a GI referral   He'll call with an update in the next week or so  Lab work as ordered to r/o any potential underlying causes or contributing factors     Anxiety  Worsening  Zoloft increased to 50 mg the past few weeks via his Psychiatrist   He does have Atarax for prn use and I encouraged him to use this when needed  Follow up with Psychiatrist and therapist   Denies SI/HI  ED precautions reviewed     Major depressive disorder, single episode, severe without psychotic features ([facility] )  Depression seems to be stable at this time (anxiety is more of an issue currently- see separate plan)  Zoloft increased to 50 mg the past few weeks via his Psychiatrist   He does have Atarax for prn use and I encouraged him to use this when needed  Follow up with Psychiatrist and therapist   Denies SI/HI  ED precautions reviewed        Diagnoses and all orders for this visit:    Nausea  -     CBC and differential; Future  -     Comprehensive metabolic panel; Future  -     TSH, 3rd generation with Free T4 reflex; Future  -     ondansetron (ZOFRAN) 4 mg tablet; Take 1 tablet (4 mg total) by mouth every 8 (eight) hours as needed for nausea or vomiting    Decreased appetite  -     CBC and differential; Future  -     Comprehensive metabolic panel; Future  -     TSH, 3rd generation with Free T4 reflex; Future    Anxiety  -     CBC and differential; Future  -     Comprehensive metabolic panel; Future  -     TSH, 3rd generation with Free T4 reflex; Future    Major depressive disorder, single episode, severe without psychotic features ([facility] )  -     CBC and differential; Future  -     Comprehensive metabolic panel;  Future  -     TSH, 3rd generation with Free T4 reflex; Future    Other orders  -     sertraline (ZOLOFT) 50 mg tablet; Take 50 mg by mouth daily  -     hydrOXYzine HCL (ATARAX) 25 mg tablet; Take 25 mg by mouth 2 (two) times a day as needed          Subjective:      Patient ID: Harrison Veliz is a 23 y o  male  HPI     Pt presents by himself today for an acute visit    Pt with major depression, had an inpatient psychiatric hospitalization 8/2019  He now follows with a private Psychiatrist and therapist regularly  His Zolfot was just increased to 50 mg daily  He notes a few headaches after increasing this dosage  He notes an increase in anxiety after returning to school approximately 2 months ago  He reports nausea whenever he tries to eat (all meals, no matter the type of food) and a decreased appetite  He feels he is now having increased anxiety in anticipation of feeling nauseous  No vomiting or actual abdominal pain  Denies abdominal bloating, distention or early satiety  Denies diarrhea or constipation     Recently started taking Atarax 25 mg one tab BID prn but has only taken this a few times  This did help his anxiety but caused some fatigue     Family is supportive, denies SI/HI    The following portions of the patient's history were reviewed and updated as appropriate: allergies, current medications, past family history, past medical history, past social history, past surgical history and problem list     Review of Systems   Constitutional: Positive for appetite change  Negative for chills, fever and unexpected weight change  HENT: Negative for ear pain and sore throat  Eyes: Negative for pain and visual disturbance  Respiratory: Negative for cough and shortness of breath  Cardiovascular: Negative for chest pain and palpitations  Gastrointestinal: Positive for nausea  Negative for abdominal distention, abdominal pain, anal bleeding, blood in stool, constipation, diarrhea, rectal pain and vomiting     Genitourinary: Negative for dysuria and hematuria  Musculoskeletal: Negative for arthralgias and back pain  Skin: Negative for color change and rash  Neurological: Negative for seizures and syncope  Psychiatric/Behavioral: Positive for dysphoric mood  Negative for self-injury and suicidal ideas  The patient is nervous/anxious  All other systems reviewed and are negative  Objective:      Pulse 103   Temp 99 4 °F (37 4 °C) (Tympanic)   Resp 18   Ht 5' 9" (1 753 m)   Wt 65 6 kg (144 lb 9 6 oz)   SpO2 98%   BMI 21 35 kg/m²          Physical Exam  Constitutional:       General: He is not in acute distress  Appearance: He is well-developed  He is not ill-appearing, toxic-appearing or diaphoretic  HENT:      Head: Normocephalic and atraumatic  Eyes:      Extraocular Movements: Extraocular movements intact  Conjunctiva/sclera: Conjunctivae normal       Pupils: Pupils are equal, round, and reactive to light  Neck:      Musculoskeletal: Normal range of motion and neck supple  No neck rigidity or muscular tenderness  Thyroid: No thyromegaly  Cardiovascular:      Rate and Rhythm: Normal rate and regular rhythm  Heart sounds: Normal heart sounds  No murmur  Pulmonary:      Effort: Pulmonary effort is normal  No respiratory distress  Breath sounds: Normal breath sounds  No wheezing  Abdominal:      General: Bowel sounds are normal  There is no distension  Palpations: Abdomen is soft  There is no mass  Tenderness: There is no abdominal tenderness  There is no right CVA tenderness, left CVA tenderness, guarding or rebound  Hernia: No hernia is present  Musculoskeletal: Normal range of motion  Lymphadenopathy:      Cervical: No cervical adenopathy  Skin:     General: Skin is warm and dry  Neurological:      Mental Status: He is alert and oriented to person, place, and time  Psychiatric:         Mood and Affect: Mood is anxious           Behavior: Behavior normal  Thought Content:  Thought content normal          Judgment: Judgment normal

## 2021-04-15 NOTE — ASSESSMENT & PLAN NOTE
Depression seems to be stable at this time (anxiety is more of an issue currently- see separate plan)  Zoloft increased to 50 mg the past few weeks via his Psychiatrist   He does have Atarax for prn use and I encouraged him to use this when needed  Follow up with Psychiatrist and therapist   Denies SI/HI  ED precautions reviewed

## 2021-08-15 PROBLEM — Z00.00 WELL ADULT EXAM: Status: ACTIVE | Noted: 2020-12-23

## 2021-08-17 ENCOUNTER — OFFICE VISIT (OUTPATIENT)
Dept: FAMILY MEDICINE CLINIC | Facility: CLINIC | Age: 21
End: 2021-08-17
Payer: COMMERCIAL

## 2021-08-17 VITALS
WEIGHT: 143 LBS | BODY MASS INDEX: 20.47 KG/M2 | SYSTOLIC BLOOD PRESSURE: 100 MMHG | DIASTOLIC BLOOD PRESSURE: 64 MMHG | HEART RATE: 110 BPM | HEIGHT: 70 IN | RESPIRATION RATE: 14 BRPM | TEMPERATURE: 98 F | OXYGEN SATURATION: 97 %

## 2021-08-17 DIAGNOSIS — F41.9 ANXIETY: ICD-10-CM

## 2021-08-17 DIAGNOSIS — Z00.00 WELL ADULT EXAM: Primary | ICD-10-CM

## 2021-08-17 DIAGNOSIS — F32.5 MAJOR DEPRESSION IN COMPLETE REMISSION (HCC): ICD-10-CM

## 2021-08-17 PROCEDURE — 99395 PREV VISIT EST AGE 18-39: CPT | Performed by: FAMILY MEDICINE

## 2021-08-17 NOTE — PROGRESS NOTES
Chief Complaint   Patient presents with    Physical Exam     Annual Physical     Health Maintenance   Topic Date Due    Hepatitis C Screening  Never done    HIV Screening  Never done    Annual Physical  07/30/2021    Influenza Vaccine (1) 09/01/2021    BMI: Adult  08/17/2022    Depression Remission PHQ  08/17/2022    DTaP,Tdap,and Td Vaccines (8 - Td or Tdap) 12/23/2030    HIB Vaccine  Completed    Hepatitis B Vaccine  Completed    IPV Vaccine  Completed    Hepatitis A Vaccine  Completed    HPV Vaccine  Completed    COVID-19 Vaccine  Completed    Pneumococcal Vaccine: Pediatrics (0 to 5 Years) and At-Risk Patients (6 to 59 Years)  Aged Out    Meningococcal ACWY Vaccine  Aged Out           Depression Screening and Follow-up Plan: Patient's depression screening was positive with a PHQ-2 score of 0  Their PHQ-9 score was 0  Continue regular follow-up with their mental health provider who is managing their mental health condition(s)  Assessment/Plan:    Well adult exam  Recommended to follow a well balanced diet, regular exercise  Immunizations are up to date  Patient is going back to [facility] next week  Patient is not sexually active currently  Discussed safe sexual practices  Recommended to use condoms when becomes sexually active to prevent STI's  Anxiety  Mood has been stable  Continue Zoloft 50 mg daily  Follow-up with private psychiatrist, continue psychotherapy  Major depression in complete remission (HCC)  PHQ-9 score is 0  Continue Zoloft 50 milligram daily  Management per psychiatry  Schedule annual physical exam in 1 year  Call office with any acute problems  Diagnoses and all orders for this visit:    Well adult exam    Anxiety    Major depression in complete remission ([facility] )          Subjective:      Patient ID: Leo Murphy is a 23 y o  male      HPI     Patient presents for annual physical exam       He offers no complaints today   Denies chest pain, shortness of breath, dizziness  He follows a healthy diet  He works at [employer] until goes back to [employer] next week  PMHx: Anxiety disorder, Depression  Patient has been followed by private psychiatrist, seeing a psychotherapist     Estevan Joy has been stable  Reports anxiety attacks  Currently taking Zoloft 50 mg daily, Hydroxyzine 25 mg PRN  Denies tobacco, drug use  Drinks alcohol occasionally  Immunizations are up-to-date  Patient completed COVID vaccination in April 2021  Reports no adverse side effects  Tdap done in 12/2020  Not sexually active currently  Denies H/o STI's  He has order in [facility] for STI's screening  The following portions of the patient's history were reviewed and updated as appropriate: allergies, current medications, past medical history, past social history, past surgical history and problem list     Review of Systems   Constitutional: Negative for activity change, appetite change, chills, fatigue and fever  HENT: Negative for congestion, dental problem, ear pain, facial swelling, hearing loss, mouth sores, nosebleeds, sore throat, tinnitus and trouble swallowing  Eyes: Negative for pain, discharge, redness, itching and visual disturbance  Respiratory: Negative for cough, chest tightness, shortness of breath and wheezing  Cardiovascular: Negative for chest pain, palpitations and leg swelling  Gastrointestinal: Negative for abdominal pain, blood in stool, constipation, diarrhea, nausea and vomiting  Genitourinary: Negative for difficulty urinating, dysuria, flank pain, frequency, hematuria and testicular pain  Musculoskeletal: Negative for arthralgias, back pain, myalgias and neck pain  Skin: Negative for rash  Neurological: Negative for dizziness, syncope and headaches  Hematological: Negative  Psychiatric/Behavioral: Negative for sleep disturbance and suicidal ideas          Anxiety / Depression - mood has been stable  Objective:      /64 (BP Location: Left arm, Patient Position: Sitting, Cuff Size: Adult)   Pulse (!) 110   Temp 98 °F (36 7 °C) (Tympanic)   Resp 14   Ht 5' 9 5" (1 765 m)   Wt 64 9 kg (143 lb)   SpO2 97%   BMI 20 81 kg/m²          Physical Exam  Vitals and nursing note reviewed  Constitutional:       Appearance: Normal appearance  HENT:      Head: Normocephalic and atraumatic  Right Ear: Tympanic membrane and external ear normal       Left Ear: Tympanic membrane and external ear normal       Nose: No congestion  Eyes:      Conjunctiva/sclera: Conjunctivae normal       Pupils: Pupils are equal, round, and reactive to light  Cardiovascular:      Rate and Rhythm: Normal rate and regular rhythm  Heart sounds: No murmur heard  Pulmonary:      Effort: Pulmonary effort is normal       Breath sounds: Normal breath sounds  Abdominal:      General: Abdomen is flat  Bowel sounds are normal  There is no distension  Palpations: Abdomen is soft  Tenderness: There is no abdominal tenderness  Musculoskeletal:         General: No swelling, tenderness or deformity  Normal range of motion  Cervical back: Normal range of motion and neck supple  No tenderness  Right lower leg: No edema  Left lower leg: No edema  Lymphadenopathy:      Cervical: No cervical adenopathy  Skin:     General: Skin is warm and dry  Findings: No rash  Comments: Pigmented mole on right inner thigh ( no changes in color, size)   Neurological:      General: No focal deficit present  Mental Status: He is alert  Motor: No weakness  Coordination: Coordination normal       Gait: Gait normal    Psychiatric:         Mood and Affect: Mood normal          Behavior: Behavior normal          Thought Content:  Thought content normal          Judgment: Judgment normal

## 2021-08-17 NOTE — ASSESSMENT & PLAN NOTE
Recommended to follow a well balanced diet, regular exercise  Immunizations are up to date  Patient is going back to [employer] [facility] next week  Patient is not sexually active currently  Discussed safe sexual practices  Recommended to use condoms when becomes sexually active to prevent STI's

## 2021-09-16 ENCOUNTER — HOSPITAL ENCOUNTER (EMERGENCY)
Facility: HOSPITAL | Age: 21
Discharge: HOME | End: 2021-09-16
Attending: EMERGENCY MEDICINE
Payer: COMMERCIAL

## 2021-09-16 VITALS
DIASTOLIC BLOOD PRESSURE: 53 MMHG | TEMPERATURE: 98.1 F | RESPIRATION RATE: 18 BRPM | OXYGEN SATURATION: 98 % | HEART RATE: 109 BPM | SYSTOLIC BLOOD PRESSURE: 113 MMHG

## 2021-09-16 DIAGNOSIS — J06.9 URI (UPPER RESPIRATORY INFECTION): ICD-10-CM

## 2021-09-16 DIAGNOSIS — H66.92 LEFT OTITIS MEDIA: Primary | ICD-10-CM

## 2021-09-16 LAB — SARS-COV-2 RNA RESP QL NAA+PROBE: NEGATIVE

## 2021-09-16 PROCEDURE — U0003 INFECTIOUS AGENT DETECTION BY NUCLEIC ACID (DNA OR RNA); SEVERE ACUTE RESPIRATORY SYNDROME CORONAVIRUS 2 (SARS-COV-2) (CORONAVIRUS DISEASE [COVID-19]), AMPLIFIED PROBE TECHNIQUE, MAKING USE OF HIGH THROUGHPUT TECHNOLOGIES AS DESCRIBED BY CMS-2020-01-R: HCPCS | Performed by: EMERGENCY MEDICINE

## 2021-09-16 PROCEDURE — 96372 THER/PROPH/DIAG INJ SC/IM: CPT

## 2021-09-16 PROCEDURE — 99284 EMERGENCY DEPT VISIT MOD MDM: CPT | Performed by: EMERGENCY MEDICINE

## 2021-09-16 PROCEDURE — 99283 EMERGENCY DEPT VISIT LOW MDM: CPT

## 2021-09-16 PROCEDURE — U0005 INFEC AGEN DETEC AMPLI PROBE: HCPCS | Performed by: EMERGENCY MEDICINE

## 2021-09-16 RX ORDER — ACETAMINOPHEN 325 MG/1
650 TABLET ORAL ONCE
Status: COMPLETED | OUTPATIENT
Start: 2021-09-16 | End: 2021-09-16

## 2021-09-16 RX ORDER — SENNOSIDES 8.6 MG
650 CAPSULE ORAL EVERY 8 HOURS PRN
Qty: 30 TABLET | Refills: 0 | Status: SHIPPED | OUTPATIENT
Start: 2021-09-16

## 2021-09-16 RX ORDER — IBUPROFEN 600 MG/1
600 TABLET ORAL EVERY 8 HOURS PRN
Qty: 30 TABLET | Refills: 0 | Status: SHIPPED | OUTPATIENT
Start: 2021-09-16

## 2021-09-16 RX ORDER — CETIRIZINE HYDROCHLORIDE 10 MG/1
10 TABLET ORAL DAILY
Qty: 10 TABLET | Refills: 0 | Status: SHIPPED | OUTPATIENT
Start: 2021-09-16

## 2021-09-16 RX ORDER — AMOXICILLIN 250 MG/1
500 CAPSULE ORAL ONCE
Status: COMPLETED | OUTPATIENT
Start: 2021-09-16 | End: 2021-09-16

## 2021-09-16 RX ORDER — KETOROLAC TROMETHAMINE 30 MG/ML
15 INJECTION, SOLUTION INTRAMUSCULAR; INTRAVENOUS ONCE
Status: COMPLETED | OUTPATIENT
Start: 2021-09-16 | End: 2021-09-16

## 2021-09-16 RX ORDER — AMOXICILLIN 500 MG/1
500 CAPSULE ORAL EVERY 12 HOURS SCHEDULED
Qty: 20 CAPSULE | Refills: 0 | Status: SHIPPED | OUTPATIENT
Start: 2021-09-16 | End: 2021-09-26

## 2021-09-16 RX ADMIN — AMOXICILLIN 500 MG: 250 CAPSULE ORAL at 04:08

## 2021-09-16 RX ADMIN — KETOROLAC TROMETHAMINE 15 MG: 30 INJECTION, SOLUTION INTRAMUSCULAR; INTRAVENOUS at 04:10

## 2021-09-16 RX ADMIN — ACETAMINOPHEN 650 MG: 325 TABLET, FILM COATED ORAL at 04:09

## 2021-09-16 NOTE — ED PROVIDER NOTES
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" past few days and dull L earache and pain on L side of throat with swallowing yesterday  Did not take anything for pain or symptoms and woke up 2 hours ago with more severe L earache  Has very obvious L OM, no mastoid tenderness, + ant cerv lymphadenopathy, pharynx clear, no erythema, redness  Pt is vaccinated and no tracing has put him in contact with anyone with COVID       History provided by:  Patient   used: No    Earache  Location:  Left  Behind ear:  No abnormality  Quality:  Aching  Severity:  Moderate  Onset quality:  Gradual  Duration:  2 days  Timing:  Constant  Progression:  Worsening  Chronicity:  New  Context: recent URI    Relieved by:  Nothing  Worsened by:  Nothing  Ineffective treatments:  None tried  Associated symptoms: congestion and sore throat (L sided)    Associated symptoms: no abdominal pain, no cough, no diarrhea, no fever, no headaches, no neck pain, no rash, no rhinorrhea and no vomiting    Congestion:     Location:  Nasal  Sore throat:     Severity:  Mild    Onset quality:  Gradual    Duration:  2 days    Timing: only with swallowing  Prior to Admission Medications   Prescriptions Last Dose Informant Patient Reported?  Taking?   hydrOXYzine HCL (ATARAX) 25 mg tablet  Self Yes No   Sig: Take 25 mg by mouth 2 (two) times a day as needed   ondansetron (ZOFRAN) 4 mg tablet  Self No No   Sig: Take 1 tablet (4 mg total) by mouth every 8 (eight) hours as needed for nausea or vomiting   sertraline (ZOLOFT) 50 mg tablet  Self Yes No   Sig: Take 50 mg by mouth daily      Facility-Administered Medications: None       Past Medical History:   Diagnosis Date    Anxiety     Depression     Pneumonia     Sleep difficulties Past Surgical History:   Procedure Laterality Date    CIRCUMCISION      WISDOM TOOTH EXTRACTION         Family History   Problem Relation Age of Onset    No Known Problems Mother     Asthma Father     Eczema Father     Asthma Family     Eczema Family      I have reviewed and agree with the history as documented  E-Cigarette/Vaping    E-Cigarette Use Never User      E-Cigarette/Vaping Substances     Social History     Tobacco Use    Smoking status: Never Smoker    Smokeless tobacco: Never Used    Tobacco comment: no tobacco/smoke exposure   Vaping Use    Vaping Use: Never used   Substance Use Topics    Alcohol use: Yes    Drug use: No       Review of Systems   Constitutional: Negative for chills and fever  HENT: Positive for congestion, ear pain (L sided) and sore throat (L sided)  Negative for rhinorrhea, trouble swallowing and voice change  Eyes: Negative for visual disturbance  Respiratory: Negative for cough, shortness of breath and wheezing  Cardiovascular: Negative for chest pain and palpitations  Gastrointestinal: Negative for abdominal pain, diarrhea, nausea and vomiting  Genitourinary: Negative for dysuria  Musculoskeletal: Negative for neck pain and neck stiffness  Skin: Negative for pallor and rash  Neurological: Negative for headaches  Psychiatric/Behavioral: Negative for confusion  All other systems reviewed and are negative  Physical Exam  Physical Exam  Vitals and nursing note reviewed  Constitutional:       Appearance: He is well-developed  HENT:      Head: Normocephalic and atraumatic  Right Ear: Tympanic membrane normal       Ears:      Comments: L TM erythematous, bulging, injected     Nose: Congestion present  No rhinorrhea  Mouth/Throat:      Mouth: Mucous membranes are moist       Pharynx: No oropharyngeal exudate or posterior oropharyngeal erythema     Eyes:      Conjunctiva/sclera: Conjunctivae normal    Cardiovascular:      Rate and Rhythm: Regular rhythm  Tachycardia present  Heart sounds: No murmur heard  Comments:   Pulmonary:      Effort: Pulmonary effort is normal  No respiratory distress  Breath sounds: Normal breath sounds  Abdominal:      Palpations: Abdomen is soft  Tenderness: There is no abdominal tenderness  Musculoskeletal:      Cervical back: Neck supple  Lymphadenopathy:      Cervical: Cervical adenopathy (mild anterior L sided) present  Skin:     General: Skin is warm and dry  Neurological:      Mental Status: He is alert  Vital Signs  ED Triage Vitals   Temperature Pulse Respirations Blood Pressure SpO2   09/16/21 0342 09/16/21 0339 09/16/21 0339 09/16/21 0339 09/16/21 0339   98 1 °F (36 7 °C) (!) 109 18 131/79 98 %      Temp Source Heart Rate Source Patient Position - Orthostatic VS BP Location FiO2 (%)   09/16/21 0342 09/16/21 0339 09/16/21 0339 09/16/21 0339 --   Oral Monitor Lying Right arm       Pain Score       09/16/21 0339       6           Vitals:    09/16/21 0339 09/16/21 0430   BP: 131/79 113/53   Pulse: (!) 109    Patient Position - Orthostatic VS: Lying          Visual Acuity      ED Medications  Medications   acetaminophen (TYLENOL) tablet 650 mg (650 mg Oral Given 9/16/21 0409)   ketorolac (TORADOL) injection 15 mg (15 mg Intramuscular Given 9/16/21 0410)   amoxicillin (AMOXIL) capsule 500 mg (500 mg Oral Given 9/16/21 0408)       Diagnostic Studies  Results Reviewed     Procedure Component Value Units Date/Time    Novel Coronavirus (Covid-19),PCR [facility] - 24 Hour Routine [[id number]] Collected: 09/16/21 0407    Lab Status: In process Specimen: Nares from Nasopharyngeal Swab Updated: 09/16/21 0427                 No orders to display              Procedures  Procedures         ED Course  ED Course as of Sep 16 0436   Thu Sep 16, 2021   7953 Pt seen and examined   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 ""under the weather with cold symptoms" past few days and dull L earache and pain on L side of throat with swallowing yesterday  Did not take anything for pain or symptoms and woke up 2 hours ago with more severe L earache  Has very obvious L OM, no mastoid tenderness, + ant cerv lymphadenopathy, pharynx clear, no erythema, redness  Will treat with IM toradol, tylenol and amoxil  Pt is vaccinated and no tracing has put him in contact with anyone with COVID but he was going to go get tested anyway tomorrow - will save him the trip and swab while here  0430 Pt got lightheaded after IM injection - allowing him to rest prior to d/c  SBIRT 22yo+      Most Recent Value   SBIRT (24 yo +)   In order to provide better care to our patients, we are screening all of our patients for alcohol and drug use  Would it be okay to ask you these screening questions? Unable to answer at this time Filed at: 09/16/2021 0414                    MDM    Disposition  Final diagnoses:   Left otitis media   URI (upper respiratory infection)     Time reflects when diagnosis was documented in both MDM as applicable and the Disposition within this note     Time User Action Codes Description Comment    9/16/2021  4:08 AM Claribel SEGUNDO Add [H66 92] Left otitis media     9/16/2021  4:08 AM Claribel SEGUNDO Add [J06 9] URI (upper respiratory infection)       ED Disposition     ED Disposition Condition Date/Time Comment    Discharge Stable u Sep 16, 2021  4:08 AM Emy Butt discharge to home/self care              Follow-up Information     Follow up With Specialties Details Why Contact Info    Patricia Gross MD Family Medicine Schedule an appointment as soon as possible for a visit  As needed [address] [facility]  [phone]            Patient's Medications   Discharge Prescriptions    ACETAMINOPHEN (TYLENOL) 650 MG CR TABLET    Take 1 tablet (650 mg total) by mouth every 8 (eight) hours as needed for mild pain       Start Date: 9/16/2021 End Date: -- Order Dose: 650 mg       Quantity: 30 tablet    Refills: 0    AMOXICILLIN (AMOXIL) 500 MG CAPSULE    Take 1 capsule (500 mg total) by mouth every 12 (twelve) hours for 10 days       Start Date: 9/16/2021 End Date: 9/26/2021       Order Dose: 500 mg       Quantity: 20 capsule    Refills: 0    CETIRIZINE (ZYRTEC) 10 MG TABLET    Take 1 tablet (10 mg total) by mouth daily       Start Date: 9/16/2021 End Date: --       Order Dose: 10 mg       Quantity: 10 tablet    Refills: 0    IBUPROFEN (MOTRIN) 600 MG TABLET    Take 1 tablet (600 mg total) by mouth every 8 (eight) hours as needed for mild pain or fever       Start Date: 9/16/2021 End Date: --       Order Dose: 600 mg       Quantity: 30 tablet    Refills: 0     No discharge procedures on file      PDMP Review     None          ED Provider  Electronically Signed by           Lala Mary DO  09/16/21 0354

## 2021-12-03 ENCOUNTER — HOSPITAL ENCOUNTER (EMERGENCY)
Facility: HOSPITAL | Age: 21
Discharge: HOME | End: 2021-12-03
Attending: EMERGENCY MEDICINE | Admitting: EMERGENCY MEDICINE
Payer: COMMERCIAL

## 2021-12-03 VITALS
BODY MASS INDEX: 21.44 KG/M2 | OXYGEN SATURATION: 98 % | SYSTOLIC BLOOD PRESSURE: 148 MMHG | HEART RATE: 98 BPM | WEIGHT: 147.27 LBS | DIASTOLIC BLOOD PRESSURE: 89 MMHG | RESPIRATION RATE: 16 BRPM | TEMPERATURE: 98 F
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
## 2021-12-03 DIAGNOSIS — S01.01XA LACERATION OF SCALP, INITIAL ENCOUNTER: ICD-10-CM

## 2021-12-03 PROCEDURE — 12001 RPR S/N/AX/GEN/TRNK 2.5CM/<: CPT | Performed by: EMERGENCY MEDICINE

## 2021-12-03 PROCEDURE — 99283 EMERGENCY DEPT VISIT LOW MDM: CPT

## 2021-12-03 PROCEDURE — 99282 EMERGENCY DEPT VISIT SF MDM: CPT | Performed by: EMERGENCY MEDICINE

## 2021-12-03 RX ORDER — LIDOCAINE HYDROCHLORIDE AND EPINEPHRINE 10; 10 MG/ML; UG/ML
4 INJECTION, SOLUTION INFILTRATION; PERINEURAL ONCE
Status: COMPLETED | OUTPATIENT
Start: 2021-12-03 | End: 2021-12-03

## 2021-12-03 RX ORDER — GINSENG 100 MG
1 CAPSULE ORAL ONCE
Status: COMPLETED | OUTPATIENT
Start: 2021-12-03 | End: 2021-12-03

## 2021-12-03 RX ADMIN — LIDOCAINE HYDROCHLORIDE,EPINEPHRINE BITARTRATE 4 ML: 10; .01 INJECTION, SOLUTION INFILTRATION; PERINEURAL at 22:54

## 2021-12-03 RX ADMIN — BACITRACIN ZINC 1 SMALL APPLICATION: 500 OINTMENT TOPICAL at 23:47
